# Patient Record
Sex: FEMALE | Race: WHITE | NOT HISPANIC OR LATINO | Employment: FULL TIME | URBAN - METROPOLITAN AREA
[De-identification: names, ages, dates, MRNs, and addresses within clinical notes are randomized per-mention and may not be internally consistent; named-entity substitution may affect disease eponyms.]

---

## 2018-09-10 ENCOUNTER — HOSPITAL ENCOUNTER (EMERGENCY)
Facility: CLINIC | Age: 48
Discharge: HOME | End: 2018-09-10
Attending: FAMILY MEDICINE | Admitting: FAMILY MEDICINE
Payer: COMMERCIAL

## 2018-09-10 VITALS
TEMPERATURE: 97 F | WEIGHT: 276 LBS | SYSTOLIC BLOOD PRESSURE: 151 MMHG | DIASTOLIC BLOOD PRESSURE: 90 MMHG | RESPIRATION RATE: 20 BRPM | BODY MASS INDEX: 48.89 KG/M2 | OXYGEN SATURATION: 100 %

## 2018-09-10 DIAGNOSIS — R00.2 PALPITATIONS: ICD-10-CM

## 2018-09-10 LAB
ALBUMIN SERPL-MCNC: 3.6 G/DL (ref 3.4–5)
ALP SERPL-CCNC: 76 U/L (ref 40–150)
ALT SERPL W P-5'-P-CCNC: 21 U/L (ref 0–50)
ANION GAP SERPL CALCULATED.3IONS-SCNC: 11 MMOL/L (ref 3–14)
AST SERPL W P-5'-P-CCNC: 18 U/L (ref 0–45)
BASOPHILS # BLD AUTO: 0.1 10E9/L (ref 0–0.2)
BASOPHILS NFR BLD AUTO: 0.9 %
BILIRUB SERPL-MCNC: 0.5 MG/DL (ref 0.2–1.3)
BUN SERPL-MCNC: 11 MG/DL (ref 7–30)
CALCIUM SERPL-MCNC: 9.4 MG/DL (ref 8.5–10.1)
CHLORIDE SERPL-SCNC: 110 MMOL/L (ref 94–109)
CO2 SERPL-SCNC: 27 MMOL/L (ref 20–32)
CREAT SERPL-MCNC: 0.82 MG/DL (ref 0.52–1.04)
DIFFERENTIAL METHOD BLD: NORMAL
EOSINOPHIL NFR BLD AUTO: 2.3 %
ERYTHROCYTE [DISTWIDTH] IN BLOOD BY AUTOMATED COUNT: 14.1 % (ref 10–15)
GFR SERPL CREATININE-BSD FRML MDRD: 74 ML/MIN/1.7M2
GLUCOSE SERPL-MCNC: 93 MG/DL (ref 70–99)
HCT VFR BLD AUTO: 41.2 % (ref 35–47)
HGB BLD-MCNC: 13.2 G/DL (ref 11.7–15.7)
IMM GRANULOCYTES # BLD: 0 10E9/L (ref 0–0.4)
IMM GRANULOCYTES NFR BLD: 0.2 %
LYMPHOCYTES # BLD AUTO: 3.2 10E9/L (ref 0.8–5.3)
LYMPHOCYTES NFR BLD AUTO: 57.6 %
MCH RBC QN AUTO: 28 PG (ref 26.5–33)
MCHC RBC AUTO-ENTMCNC: 32 G/DL (ref 31.5–36.5)
MCV RBC AUTO: 88 FL (ref 78–100)
MONOCYTES # BLD AUTO: 0.3 10E9/L (ref 0–1.3)
MONOCYTES NFR BLD AUTO: 5.9 %
NEUTROPHILS # BLD AUTO: 1.8 10E9/L (ref 1.6–8.3)
NEUTROPHILS NFR BLD AUTO: 33.1 %
NRBC # BLD AUTO: 0 10*3/UL
NRBC BLD AUTO-RTO: 0 /100
PLATELET # BLD AUTO: 202 10E9/L (ref 150–450)
POTASSIUM SERPL-SCNC: 3.7 MMOL/L (ref 3.4–5.3)
PROT SERPL-MCNC: 7.1 G/DL (ref 6.8–8.8)
RBC # BLD AUTO: 4.71 10E12/L (ref 3.8–5.2)
SODIUM SERPL-SCNC: 148 MMOL/L (ref 133–144)
TROPONIN I SERPL-MCNC: <0.015 UG/L (ref 0–0.04)
WBC # BLD AUTO: 5.6 10E9/L (ref 4–11)

## 2018-09-10 PROCEDURE — 93010 ELECTROCARDIOGRAM REPORT: CPT | Mod: Z6 | Performed by: FAMILY MEDICINE

## 2018-09-10 PROCEDURE — 84484 ASSAY OF TROPONIN QUANT: CPT | Performed by: FAMILY MEDICINE

## 2018-09-10 PROCEDURE — 99284 EMERGENCY DEPT VISIT MOD MDM: CPT | Performed by: FAMILY MEDICINE

## 2018-09-10 PROCEDURE — 80053 COMPREHEN METABOLIC PANEL: CPT | Performed by: FAMILY MEDICINE

## 2018-09-10 PROCEDURE — 99283 EMERGENCY DEPT VISIT LOW MDM: CPT | Mod: 25 | Performed by: FAMILY MEDICINE

## 2018-09-10 PROCEDURE — 93005 ELECTROCARDIOGRAM TRACING: CPT | Performed by: FAMILY MEDICINE

## 2018-09-10 PROCEDURE — 85025 COMPLETE CBC W/AUTO DIFF WBC: CPT | Performed by: FAMILY MEDICINE

## 2018-09-10 NOTE — ED AVS SNAPSHOT
[facility] Emergency Department    [address] [place] [address]    Phone:  [phone]    Fax:  [phone]                                       Socorro Del Cid   MRN: [id number]    Department:  [facility] Emergency Department   Date of Visit:  9/10/2018           After Visit Summary Signature Page     I have received my discharge instructions, and my questions have been answered. I have discussed any challenges I see with this plan with the nurse or doctor.    ..........................................................................................................................................  Patient/Patient Representative Signature      ..........................................................................................................................................  Patient Representative Print Name and Relationship to Patient    ..................................................               ................................................  Date                                            Time    ..........................................................................................................................................  Reviewed by Signature/Title    ...................................................              ..............................................  Date                                                            Time          22EPIC Rev 08/18

## 2018-09-10 NOTE — ED TRIAGE NOTES
"Pt reports she woke up about 420 this morning with a \"racing heart\" and describe as a \"fluttering\". States she has had this in the past but it was related to increased caffeine use. Pt states her symptoms improved when she arrived in the ED.   "

## 2018-09-10 NOTE — ED PROVIDER NOTES
History     Chief Complaint   Patient presents with     Tachycardia     HPI  Socorro Del Cid is a 48 year old female who presents with feeling like her heart is racing and fluttering.  This woke her up from her sleep and started about 30 minutes ago or so.  This is happened to her before but usually will stop on its own.  Usually if she will cough or bear down it would stop but today it did not.  This got her concerned so she decided to come in for evaluation.  She states that when she got here everything seemed to go back to normal.  She denied having any chest pain or shortness of breath.  She was not feeling lightheaded or dizzy.  She has not had a workup before for palpitations.  Patient denies any nausea or any vomiting.  She states she does not have a lot of caffeine here recently.    Problem List:    Patient Active Problem List    Diagnosis Date Noted     Morbid obesity (H) 09/18/2014     Priority: Medium        Past Medical History:    History reviewed. No pertinent past medical history.    Past Surgical History:    Past Surgical History:   Procedure Laterality Date     ABDOMEN SURGERY  1/01    Gastric bypass     HYSTERECTOMY VAGINAL, BILATERAL SALPINGO-OOPHERECTOMY, COMBINED         Family History:    Family History   Problem Relation Age of Onset     Hypertension Mother      Diabetes Mother      Cancer Mother      uterine ca     Cancer Father      prostate ca     Lipids No family hx of      HEART DISEASE No family hx of        Social History:  Marital Status:   [2]  Social History   Substance Use Topics     Smoking status: Never Smoker     Smokeless tobacco: Never Used     Alcohol use Yes      Comment: rarely        Medications:      No current outpatient prescriptions on file.      Review of Systems   All other systems reviewed and are negative.      Physical Exam   BP: 130/88  Heart Rate: 67  Temp: 97  F (36.1  C)  Resp: 20  Weight: 125.2 kg (276 lb)  SpO2: 100 %      Physical Exam    Constitutional: She is oriented to person, place, and time. She appears well-developed and well-nourished. No distress.   HENT:   Mouth/Throat: Oropharynx is clear and moist.   Eyes: Conjunctivae are normal.   Neck: Normal range of motion. Neck supple.   Cardiovascular: Normal rate, regular rhythm, normal heart sounds and intact distal pulses.  Exam reveals no gallop and no friction rub.    No murmur heard.  Pulmonary/Chest: Effort normal and breath sounds normal. No respiratory distress. She has no wheezes. She has no rales. She exhibits no tenderness.   Abdominal: Soft. Bowel sounds are normal. She exhibits no distension and no mass. There is no tenderness. There is no guarding.   Musculoskeletal: Normal range of motion. She exhibits no edema or tenderness.   Neurological: She is alert and oriented to person, place, and time.   Skin: Skin is warm and dry. No rash noted. She is not diaphoretic.   Psychiatric: She has a normal mood and affect. Judgment normal.   Nursing note and vitals reviewed.      ED Course     ED Course     Procedures                EKG Interpretation:      Interpreted by Lane Oakley  Time reviewed: now   Symptoms at time of EKG: now   Rhythm: normal sinus   Rate: normal  Axis: NORMAL  Ectopy: none  Conduction: normal  ST Segments/ T Waves: No ST-T wave changes  Q Waves: none  Comparison to prior: No old EKG available    Clinical Impression: normal EKG  Results for orders placed or performed during the hospital encounter of 09/10/18   CBC with platelets differential   Result Value Ref Range    WBC 5.6 4.0 - 11.0 10e9/L    RBC Count 4.71 3.8 - 5.2 10e12/L    Hemoglobin 13.2 11.7 - 15.7 g/dL    Hematocrit 41.2 35.0 - 47.0 %    MCV 88 78 - 100 fl    MCH 28.0 26.5 - 33.0 pg    MCHC 32.0 31.5 - 36.5 g/dL    RDW 14.1 10.0 - 15.0 %    Platelet Count 202 150 - 450 10e9/L    Diff Method Automated Method     % Neutrophils 33.1 %    % Lymphocytes 57.6 %    % Monocytes 5.9 %    % Eosinophils 2.3  %    % Basophils 0.9 %    % Immature Granulocytes 0.2 %    Nucleated RBCs 0 0 /100    Absolute Neutrophil 1.8 1.6 - 8.3 10e9/L    Absolute Lymphocytes 3.2 0.8 - 5.3 10e9/L    Absolute Monocytes 0.3 0.0 - 1.3 10e9/L    Absolute Basophils 0.1 0.0 - 0.2 10e9/L    Abs Immature Granulocytes 0.0 0 - 0.4 10e9/L    Absolute Nucleated RBC 0.0    Comprehensive metabolic panel   Result Value Ref Range    Sodium 148 (H) 133 - 144 mmol/L    Potassium 3.7 3.4 - 5.3 mmol/L    Chloride 110 (H) 94 - 109 mmol/L    Carbon Dioxide 27 20 - 32 mmol/L    Anion Gap 11 3 - 14 mmol/L    Glucose 93 70 - 99 mg/dL    Urea Nitrogen 11 7 - 30 mg/dL    Creatinine 0.82 0.52 - 1.04 mg/dL    GFR Estimate 74 >60 mL/min/1.7m2    GFR Estimate If Black 89 >60 mL/min/1.7m2    Calcium 9.4 8.5 - 10.1 mg/dL    Bilirubin Total 0.5 0.2 - 1.3 mg/dL    Albumin 3.6 3.4 - 5.0 g/dL    Protein Total 7.1 6.8 - 8.8 g/dL    Alkaline Phosphatase 76 40 - 150 U/L    ALT 21 0 - 50 U/L    AST 18 0 - 45 U/L   Troponin I   Result Value Ref Range    Troponin I ES <0.015 0.000 - 0.045 ug/L     Medications - No data to display  Patient labs were reviewed and were unremarkable including a negative troponin.  Patient was monitored here in the emergency department for more than an hour and had no arrhythmias or other heart issues noted on the monitor.  At this point I think it is safe to discharge the patient home.  Certainly sounds suspicious that the patient could have had some runs of SVT.  Patient was told to avoid any caffeine or other stimulants until she can follow-up with primary care doctor.  I would recommend discussing getting an outpatient event monitor possibly set up.      Assessments & Plan (with Medical Decision Making)  palpitations     I have reviewed the nursing notes.    I have reviewed the findings, diagnosis, plan and need for follow up with the patient.              9/10/2018   [facility] EMERGENCY DEPARTMENT     Lane Oakley MD  09/10/18  [id number]

## 2018-09-10 NOTE — DISCHARGE INSTRUCTIONS
"  1.  I would recommend talking to your primary care doctor about getting an event monitor set up for further evaluation of your palpitations.  2.  If your symptoms return, try coughing or bearing down or laying down.  If things do not improve, after 30 minutes, please return to the emergency department.    * Heart Palpitations    Palpitations refers to the feeling that your heart is beating hard, fast or irregular. Some people describe it as \"pounding\" or \"skipped beats\". Palpitations may occur in persons with heart disease, but can also occur in healthy persons. Your doctor does not believe that anything dangerous is causing your symptoms at this time.  Heart-Related Causes:    Arrhythmia (a change from the heart's normal rhythm)    Disease of the heart valves  Non-Heart-Related Causes:    Certain medicines (such as asthma inhalers and decongestants)    Some herbal supplements, energy drinks and pills, and weight loss pills    Illegal stimulant drugs (such as cocaine, crank, methamphetamine, PCP)    Caffeine, alcohol and tobacco    Medical conditions such as thyroid disease, anemia, anxiety and panic disorder  Sometimes the cause cannot be found.  Home Care:  1. Avoid excess caffeine, alcohol, tobacco and any stimulant drugs.  2. Tell your doctor about any prescription or over-the-counter or herbal medicines you take.  Follow Up  with your doctor or as advised by our staff.  Get Prompt Medical Attention  if any of the following occur together with palpitations:    Weakness, dizziness, light-headed or fainting    Chest pain or shortness of breath    Rapid heart rate (over 120 beats per minute, at rest)    Palpitations that lasts over 20 minutes    Weakness of an arm or leg or one side of the face    Difficulty with speech or vision    3522-9789 [employer]. [address] [place] [address]. All rights reserved. This information is not intended as a substitute for professional medical care. " Always follow your healthcare professional's instructions.  This information has been modified by your health care provider with permission from the publisher.

## 2018-09-10 NOTE — PROGRESS NOTES
SUBJECTIVE:   Socorro Del Cid is a 48 year old female who presents to clinic today for the following health issues:      HPI  ED/UC Followup:    Facility:  [facility]  Date of visit: 9/10/2018  Reason for visit: Palpitations  Current Status: Patient states she is feeling better. She hasn't had any heart palpitations since Monday. She was seen at the [facility] for heart palpitations on 9/12. She states she has a little shortness of breath, but nothing that would concern her.      Answers for HPI/ROS submitted by the patient on 9/14/2018   PHQ-2 Score: 4  If you checked off any problems, how difficult have these problems made it for you to do your work, take care of things at home, or get along with other people?: Very difficult  PHQ9 TOTAL SCORE: 15      Dizziness, chest tightness, syncope  Normal stress echo on 9/12/18  TSH elevated    Patient is here for follow-up on ED visits this past week for chest tightness, dizziness and near syncope.  She states that she has had episodes like this since she was a child.  Her mom tells her that she used to turn very pale and almost passed out but then would recover as a child.  Usually the episodes resolve quickly but this past week they did not and this prompted her visit to the ER.  She was noted to have low heart rate in the 40s while she was in the emergency department it may discuss possibly doing a tilt test but felt that this test would not necessarily change the course of treatment for her.  She had a stress echocardiogram done in the ER and this was normal. Her blood work was normal other than her TSH being elevated.  She has wondered whether some of her symptoms were related to anxiety as she has had increased stress recently.  Her mom has moved in with her and her  and she was recently diagnosed with breast cancer.  She has been working on losing weight as well through weight watchers.  She has lost 30 pounds in the past 9 months. She has a strong family  history of anxiety and depression in her mom and both of her children.  She was on  Effexor in the past and weaned herself off of this after she started feeling better.  She feels she is at a point where she needs to restart this medication.  She is also wondering about her thyroid.  Her TSH was elevated in the ER.  She has a family history of thyroid disease in her mom and her maternal grandmother.      ED note from 9/10/18  Socorro Del Cid is a 48 year old female who presents with feeling like her heart is racing and fluttering.  This woke her up from her sleep and started about 30 minutes ago or so.  This is happened to her before but usually will stop on its own.  Usually if she will cough or bear down it would stop but today it did not.  This got her concerned so she decided to come in for evaluation.  She states that when she got here everything seemed to go back to normal.  She denied having any chest pain or shortness of breath.  She was not feeling lightheaded or dizzy.  She has not had a workup before for palpitations.  Patient denies any nausea or any vomiting.  She states she does not have a lot of caffeine here recently.    Patient labs were reviewed and were unremarkable including a negative troponin.  Patient was monitored here in the emergency department for more than an hour and had no arrhythmias or other heart issues noted on the monitor.  At this point I think it is safe to discharge the patient home.  Certainly sounds suspicious that the patient could have had some runs of SVT.  Patient was told to avoid any caffeine or other stimulants until she can follow-up with primary care doctor.  I would recommend discussing getting an outpatient event monitor possibly set up.    [facility] hospital note from 9/12/18  Admitted to the ED observation unit at the [facility] for Dizziness and chest tightness. Troponins negative. Stress test was normal. Received IV fluids improvement of her symptoms. Heart rate was noted  "to be in the 40's. EP cardiology specialists who recommended possible tilt testing as an outpatient. Recommend to discussed with your primary care doctor.  Patient has follow up with her primary care doctor on Friday and will have holter monitor placed.     Has gone to [facility] but has not been seen by FP in 4 years    Problem list and histories reviewed & adjusted, as indicated.  Additional history: as documented    BP Readings from Last 3 Encounters:   09/14/18 132/80   09/12/18 130/72   09/10/18 151/90    Wt Readings from Last 3 Encounters:   09/14/18 272 lb 6.4 oz (123.6 kg)   09/12/18 276 lb (125.2 kg)   09/10/18 276 lb (125.2 kg)                  Labs reviewed in EPIC    ROS:  Constitutional, HEENT, cardiovascular, pulmonary, GI, , musculoskeletal, neuro, skin, endocrine and psych systems are negative, except as otherwise noted.    OBJECTIVE:     /80  Pulse 56  Temp 97.9  F (36.6  C) (Oral)  Resp 18  Ht 5' 3.35\" (1.609 m)  Wt 272 lb 6.4 oz (123.6 kg)  BMI 47.73 kg/m2  Body mass index is 47.73 kg/(m^2).  GENERAL: obese, alert and no acute distress  EYES: Eyes grossly normal to inspection, PERRL and conjunctivae and sclerae normal  NECK: no adenopathy, no asymmetry, masses, or scars and thyroid normal to palpation  RESP: lungs clear to auscultation - no rales, rhonchi or wheezes  CV: regular rate and rhythm, normal S1 S2, no S3 or S4, no murmur, click or rub, no peripheral edema   MS: no gross musculoskeletal defects noted, no edema  SKIN: no suspicious lesions or rashes  NEURO: Normal strength and tone, mentation intact and speech normal  PSYCH: mentation appears normal, affect normal/bright, well-groomed, judgment and insight intact  LYMPH: no cervical, supraclavicular adenopathy    Diagnostic Test Results:  none     ASSESSMENT/PLAN:     1. Palpitations  Chronic issue with recent episodes of longer duration.  Cardiac workup thus far has been normal.  Discussed doing tilt table test " but will hold off for now as it will likely not change the course of action.  She will work on hydrating better and will return to clinic if her symptoms worsen.    2. Moderate episode of recurrent major depressive disorder (H)  Restart venlafaxine and titrate up the dose as needed.  Previously she was taking 150 mg daily.  She will follow-up in 6 weeks in clinic for recheck.  - venlafaxine (EFFEXOR-XR) 37.5 MG 24 hr capsule; Take 1 capsule daily for 7-14 days, then take 2 capsules daily.  Dispense: 49 capsule; Refill: 1    3. Morbid obesity (H)  Congratulated on her weight loss thus far.  Recommended continuing weight watchers and increasing activity level/exercise.    4. Subclinical hypothyroidism  Recheck TSH in 6 weeks.  Consider starting a low-dose of levothyroxine if TSH remains elevated.  - TSH with free T4 reflex; Future    5. Encounter for screening mammogram for breast cancer  - *MA Screening Digital Bilateral; Future    ANJALI [facility]

## 2018-09-10 NOTE — ED AVS SNAPSHOT
" [facility] Emergency Department    [address] [place] [address]    Phone:  [phone]    Fax:  [phone]                                       Socorro Del Cid   MRN: [id number]    Department:  [facility] Emergency Department   Date of Visit:  9/10/2018           Patient Information     Date Of Birth          1970        Your diagnoses for this visit were:     Palpitations        You were seen by Lane Oakley MD.      Follow-up Information     Schedule an appointment as soon as possible for a visit with [facility].    Specialties:  Sports Medicine, Family Medicine, Obstetrics & Gynecology    Why:  For follow up on your ED stay    Contact information:    [address] [employer] [facility] [address] [phone]        Discharge Instructions         1.  I would recommend talking to your primary care doctor about getting an event monitor set up for further evaluation of your palpitations.  2.  If your symptoms return, try coughing or bearing down or laying down.  If things do not improve, after 30 minutes, please return to the emergency department.    * Heart Palpitations    Palpitations refers to the feeling that your heart is beating hard, fast or irregular. Some people describe it as \"pounding\" or \"skipped beats\". Palpitations may occur in persons with heart disease, but can also occur in healthy persons. Your doctor does not believe that anything dangerous is causing your symptoms at this time.  Heart-Related Causes:    Arrhythmia (a change from the heart's normal rhythm)    Disease of the heart valves  Non-Heart-Related Causes:    Certain medicines (such as asthma inhalers and decongestants)    Some herbal supplements, energy drinks and pills, and weight loss pills    Illegal stimulant drugs (such as cocaine, crank, methamphetamine, PCP)    Caffeine, alcohol and tobacco    Medical conditions such as thyroid disease, anemia, anxiety and panic " disorder  Sometimes the cause cannot be found.  Home Care:  1. Avoid excess caffeine, alcohol, tobacco and any stimulant drugs.  2. Tell your doctor about any prescription or over-the-counter or herbal medicines you take.  Follow Up  with your doctor or as advised by our staff.  Get Prompt Medical Attention  if any of the following occur together with palpitations:    Weakness, dizziness, light-headed or fainting    Chest pain or shortness of breath    Rapid heart rate (over 120 beats per minute, at rest)    Palpitations that lasts over 20 minutes    Weakness of an arm or leg or one side of the face    Difficulty with speech or vision    7955-2091 [employer]. [address] [place] [address]. All rights reserved. This information is not intended as a substitute for professional medical care. Always follow your healthcare professional's instructions.  This information has been modified by your health care provider with permission from the publisher.          24 Hour Appointment Hotline       To make an appointment at any [facility], call [phone] ([phone]). If you don't have a family doctor or clinic, we will help you find one. [facility] clinics are conveniently located to serve the needs of you and your family.             Review of your medicines      Notice     You have not been prescribed any medications.            Procedures and tests performed during your visit     CBC with platelets differential    Comprehensive metabolic panel    EKG 12 lead    Peripheral IV catheter    Troponin I      Orders Needing Specimen Collection     None      Pending Results     No orders found from 9/8/2018 to 9/11/2018.            Pending Culture Results     No orders found from 9/8/2018 to 9/11/2018.            Pending Results Instructions     If you had any lab results that were not finalized at the time of your Discharge, you can call the ED Lab Result RN at [phone]. You will be  "contacted by this team for any positive Lab results or changes in treatment. The nurses are available 7 days a week from 10A to 6:30P.  You can leave a message 24 hours per day and they will return your call.        Thank you for choosing [facility]       Thank you for choosing [facility] for your care. Our goal is always to provide you with excellent care. Hearing back from our patients is one way we can continue to improve our services. Please take a few minutes to complete the written survey that you may receive in the mail after you visit with us. Thank you!        [employer] In [employer] Information     [employer] lets you send messages to your doctor, view your test results, renew your prescriptions, schedule appointments and more. To sign up, go to [email or web address] . Click on \"Log in\" on the left side of the screen, which will take you to the Welcome page. Then click on \"Sign up Now\" on the right side of the page.     You will be asked to enter the access code listed below, as well as some personal information. Please follow the directions to create your username and password.     Your access code is: [email or web address]  Expires: 2018  6:12 AM     Your access code will  in 90 days. If you need help or a new code, please call your [facility] clinic or [phone].        Care EveryWhere ID     This is your Care EveryWhere ID. This could be used by other organizations to access your [facility] medical records  [id number]        Equal Access to Services     BRITNEY ROMAN : Hadii qian Krishna, waaxda luqadaha, qaybta kaalmada paula, gregory gillespie . [facility] [phone].    ATENCIÓN: Si habla español, tiene a chavis disposición servicios gratuitos de asistencia lingüística. Llame al [phone].    We comply with applicable federal civil rights laws and [place] laws. We do not discriminate on the basis of race, color, national origin, age, disability, sex, sexual orientation, or gender " identity.            After Visit Summary       This is your record. Keep this with you and show to your community pharmacist(s) and doctor(s) at your next visit.

## 2018-09-12 ENCOUNTER — APPOINTMENT (OUTPATIENT)
Dept: CARDIOLOGY | Facility: CLINIC | Age: 48
End: 2018-09-12
Attending: EMERGENCY MEDICINE
Payer: COMMERCIAL

## 2018-09-12 ENCOUNTER — HOSPITAL ENCOUNTER (OUTPATIENT)
Facility: CLINIC | Age: 48
Setting detail: OBSERVATION
Discharge: HOME | End: 2018-09-12
Attending: EMERGENCY MEDICINE | Admitting: EMERGENCY MEDICINE
Payer: COMMERCIAL

## 2018-09-12 ENCOUNTER — APPOINTMENT (OUTPATIENT)
Dept: GENERAL RADIOLOGY | Facility: CLINIC | Age: 48
End: 2018-09-12
Attending: EMERGENCY MEDICINE
Payer: COMMERCIAL

## 2018-09-12 VITALS
RESPIRATION RATE: 18 BRPM | DIASTOLIC BLOOD PRESSURE: 72 MMHG | WEIGHT: 276 LBS | BODY MASS INDEX: 48.89 KG/M2 | TEMPERATURE: 98.8 F | SYSTOLIC BLOOD PRESSURE: 130 MMHG | OXYGEN SATURATION: 97 % | HEART RATE: 59 BPM

## 2018-09-12 DIAGNOSIS — R07.9 ACUTE CHEST PAIN: ICD-10-CM

## 2018-09-12 DIAGNOSIS — R00.1 SEVERE SINUS BRADYCARDIA: ICD-10-CM

## 2018-09-12 DIAGNOSIS — R07.89 OTHER CHEST PAIN: ICD-10-CM

## 2018-09-12 DIAGNOSIS — R55 NEAR SYNCOPE: ICD-10-CM

## 2018-09-12 LAB
ALBUMIN UR-MCNC: NEGATIVE MG/DL
ANION GAP SERPL CALCULATED.3IONS-SCNC: 7 MMOL/L (ref 3–14)
APPEARANCE UR: CLEAR
APTT PPP: 33 SEC (ref 22–37)
BASOPHILS # BLD AUTO: 0 10E9/L (ref 0–0.2)
BASOPHILS NFR BLD AUTO: 0.4 %
BILIRUB UR QL STRIP: NEGATIVE
BUN SERPL-MCNC: 8 MG/DL (ref 7–30)
CALCIUM SERPL-MCNC: 9.3 MG/DL (ref 8.5–10.1)
CHLORIDE SERPL-SCNC: 108 MMOL/L (ref 94–109)
CO2 SERPL-SCNC: 28 MMOL/L (ref 20–32)
COLOR UR AUTO: NORMAL
CREAT SERPL-MCNC: 0.82 MG/DL (ref 0.52–1.04)
DIFFERENTIAL METHOD BLD: NORMAL
EOSINOPHIL # BLD AUTO: 0.1 10E9/L (ref 0–0.7)
EOSINOPHIL NFR BLD AUTO: 0.9 %
ERYTHROCYTE [DISTWIDTH] IN BLOOD BY AUTOMATED COUNT: 14.4 % (ref 10–15)
GFR SERPL CREATININE-BSD FRML MDRD: 74 ML/MIN/1.7M2
GLUCOSE SERPL-MCNC: 86 MG/DL (ref 70–99)
GLUCOSE UR STRIP-MCNC: NEGATIVE MG/DL
HCT VFR BLD AUTO: 43 % (ref 35–47)
HGB BLD-MCNC: 14.1 G/DL (ref 11.7–15.7)
HGB UR QL STRIP: NEGATIVE
IMM GRANULOCYTES # BLD: 0 10E9/L (ref 0–0.4)
IMM GRANULOCYTES NFR BLD: 0 %
INR PPP: 1.02 (ref 0.86–1.14)
INTERPRETATION ECG - MUSE: NORMAL
KETONES UR STRIP-MCNC: NEGATIVE MG/DL
LEUKOCYTE ESTERASE UR QL STRIP: NEGATIVE
LYMPHOCYTES # BLD AUTO: 2.2 10E9/L (ref 0.8–5.3)
LYMPHOCYTES NFR BLD AUTO: 41.2 %
MAGNESIUM SERPL-MCNC: 1.9 MG/DL (ref 1.6–2.3)
MCH RBC QN AUTO: 28.9 PG (ref 26.5–33)
MCHC RBC AUTO-ENTMCNC: 32.8 G/DL (ref 31.5–36.5)
MCV RBC AUTO: 88 FL (ref 78–100)
MONOCYTES # BLD AUTO: 0.4 10E9/L (ref 0–1.3)
MONOCYTES NFR BLD AUTO: 7.9 %
NEUTROPHILS # BLD AUTO: 2.6 10E9/L (ref 1.6–8.3)
NEUTROPHILS NFR BLD AUTO: 49.6 %
NITRATE UR QL: NEGATIVE
NRBC # BLD AUTO: 0 10*3/UL
NRBC BLD AUTO-RTO: 0 /100
PH UR STRIP: 7 PH (ref 5–7)
PLATELET # BLD AUTO: 208 10E9/L (ref 150–450)
POTASSIUM SERPL-SCNC: 3.4 MMOL/L (ref 3.4–5.3)
RBC # BLD AUTO: 4.88 10E12/L (ref 3.8–5.2)
SODIUM SERPL-SCNC: 144 MMOL/L (ref 133–144)
SOURCE: NORMAL
SP GR UR STRIP: 1 (ref 1–1.03)
T4 FREE SERPL-MCNC: 0.87 NG/DL (ref 0.76–1.46)
TROPONIN I BLD-MCNC: 0 UG/L (ref 0–0.1)
TROPONIN I SERPL-MCNC: <0.015 UG/L (ref 0–0.04)
TROPONIN I SERPL-MCNC: <0.015 UG/L (ref 0–0.04)
TSH SERPL DL<=0.005 MIU/L-ACNC: 6.66 MU/L (ref 0.4–4)
UROBILINOGEN UR STRIP-MCNC: NORMAL MG/DL (ref 0–2)
WBC # BLD AUTO: 5.3 10E9/L (ref 4–11)

## 2018-09-12 PROCEDURE — 84484 ASSAY OF TROPONIN QUANT: CPT

## 2018-09-12 PROCEDURE — 99285 EMERGENCY DEPT VISIT HI MDM: CPT | Mod: 25

## 2018-09-12 PROCEDURE — 93350 STRESS TTE ONLY: CPT | Mod: 26 | Performed by: INTERNAL MEDICINE

## 2018-09-12 PROCEDURE — 93017 CV STRESS TEST TRACING ONLY: CPT

## 2018-09-12 PROCEDURE — 93321 DOPPLER ECHO F-UP/LMTD STD: CPT | Mod: 26 | Performed by: INTERNAL MEDICINE

## 2018-09-12 PROCEDURE — C9113 INJ PANTOPRAZOLE SODIUM, VIA: HCPCS | Performed by: NURSE PRACTITIONER

## 2018-09-12 PROCEDURE — 81003 URINALYSIS AUTO W/O SCOPE: CPT | Performed by: EMERGENCY MEDICINE

## 2018-09-12 PROCEDURE — 71046 X-RAY EXAM CHEST 2 VIEWS: CPT

## 2018-09-12 PROCEDURE — 93325 DOPPLER ECHO COLOR FLOW MAPG: CPT | Mod: 26 | Performed by: INTERNAL MEDICINE

## 2018-09-12 PROCEDURE — 85730 THROMBOPLASTIN TIME PARTIAL: CPT | Performed by: EMERGENCY MEDICINE

## 2018-09-12 PROCEDURE — 84484 ASSAY OF TROPONIN QUANT: CPT | Performed by: EMERGENCY MEDICINE

## 2018-09-12 PROCEDURE — 83735 ASSAY OF MAGNESIUM: CPT | Performed by: EMERGENCY MEDICINE

## 2018-09-12 PROCEDURE — 84443 ASSAY THYROID STIM HORMONE: CPT | Performed by: EMERGENCY MEDICINE

## 2018-09-12 PROCEDURE — 84439 ASSAY OF FREE THYROXINE: CPT | Performed by: EMERGENCY MEDICINE

## 2018-09-12 PROCEDURE — 25000128 H RX IP 250 OP 636: Performed by: NURSE PRACTITIONER

## 2018-09-12 PROCEDURE — G0378 HOSPITAL OBSERVATION PER HR: HCPCS

## 2018-09-12 PROCEDURE — 85025 COMPLETE CBC W/AUTO DIFF WBC: CPT | Performed by: EMERGENCY MEDICINE

## 2018-09-12 PROCEDURE — 93005 ELECTROCARDIOGRAM TRACING: CPT

## 2018-09-12 PROCEDURE — 99236 HOSP IP/OBS SAME DATE HI 85: CPT | Mod: 25 | Performed by: NURSE PRACTITIONER

## 2018-09-12 PROCEDURE — 96360 HYDRATION IV INFUSION INIT: CPT | Mod: 59

## 2018-09-12 PROCEDURE — 80048 BASIC METABOLIC PNL TOTAL CA: CPT | Performed by: EMERGENCY MEDICINE

## 2018-09-12 PROCEDURE — 25000128 H RX IP 250 OP 636: Performed by: EMERGENCY MEDICINE

## 2018-09-12 PROCEDURE — 25500064 ZZH RX 255 OP 636: Performed by: INTERNAL MEDICINE

## 2018-09-12 PROCEDURE — 93018 CV STRESS TEST I&R ONLY: CPT | Performed by: INTERNAL MEDICINE

## 2018-09-12 PROCEDURE — 93016 CV STRESS TEST SUPVJ ONLY: CPT | Performed by: INTERNAL MEDICINE

## 2018-09-12 PROCEDURE — 93010 ELECTROCARDIOGRAM REPORT: CPT | Mod: Z6 | Performed by: EMERGENCY MEDICINE

## 2018-09-12 PROCEDURE — 83735 ASSAY OF MAGNESIUM: CPT | Performed by: NURSE PRACTITIONER

## 2018-09-12 PROCEDURE — 85610 PROTHROMBIN TIME: CPT | Performed by: EMERGENCY MEDICINE

## 2018-09-12 PROCEDURE — 99204 OFFICE O/P NEW MOD 45 MIN: CPT | Mod: 25 | Performed by: INTERNAL MEDICINE

## 2018-09-12 RX ORDER — POTASSIUM CHLORIDE 1.5 G/1.58G
20-40 POWDER, FOR SOLUTION ORAL
Status: DISCONTINUED | OUTPATIENT
Start: 2018-09-12 | End: 2018-09-12 | Stop reason: HOSPADM

## 2018-09-12 RX ORDER — ALUMINA, MAGNESIA, AND SIMETHICONE 2400; 2400; 240 MG/30ML; MG/30ML; MG/30ML
30 SUSPENSION ORAL EVERY 4 HOURS PRN
Status: DISCONTINUED | OUTPATIENT
Start: 2018-09-12 | End: 2018-09-12 | Stop reason: HOSPADM

## 2018-09-12 RX ORDER — POTASSIUM CL/LIDO/0.9 % NACL 10MEQ/0.1L
10 INTRAVENOUS SOLUTION, PIGGYBACK (ML) INTRAVENOUS
Status: DISCONTINUED | OUTPATIENT
Start: 2018-09-12 | End: 2018-09-12 | Stop reason: HOSPADM

## 2018-09-12 RX ORDER — ACETAMINOPHEN 650 MG/1
650 SUPPOSITORY RECTAL EVERY 4 HOURS PRN
Status: DISCONTINUED | OUTPATIENT
Start: 2018-09-12 | End: 2018-09-12 | Stop reason: HOSPADM

## 2018-09-12 RX ORDER — NITROGLYCERIN 0.4 MG/1
0.4 TABLET SUBLINGUAL EVERY 5 MIN PRN
Status: DISCONTINUED | OUTPATIENT
Start: 2018-09-12 | End: 2018-09-12 | Stop reason: HOSPADM

## 2018-09-12 RX ORDER — POTASSIUM CHLORIDE 29.8 MG/ML
20 INJECTION INTRAVENOUS
Status: DISCONTINUED | OUTPATIENT
Start: 2018-09-12 | End: 2018-09-12 | Stop reason: HOSPADM

## 2018-09-12 RX ORDER — POTASSIUM CHLORIDE 750 MG/1
20-40 TABLET, EXTENDED RELEASE ORAL
Status: DISCONTINUED | OUTPATIENT
Start: 2018-09-12 | End: 2018-09-12 | Stop reason: HOSPADM

## 2018-09-12 RX ORDER — POTASSIUM CHLORIDE 7.45 MG/ML
10 INJECTION INTRAVENOUS
Status: DISCONTINUED | OUTPATIENT
Start: 2018-09-12 | End: 2018-09-12 | Stop reason: HOSPADM

## 2018-09-12 RX ORDER — ACETAMINOPHEN 325 MG/1
650 TABLET ORAL EVERY 4 HOURS PRN
Status: DISCONTINUED | OUTPATIENT
Start: 2018-09-12 | End: 2018-09-12 | Stop reason: HOSPADM

## 2018-09-12 RX ORDER — MAGNESIUM SULFATE HEPTAHYDRATE 40 MG/ML
4 INJECTION, SOLUTION INTRAVENOUS EVERY 4 HOURS PRN
Status: DISCONTINUED | OUTPATIENT
Start: 2018-09-12 | End: 2018-09-12 | Stop reason: HOSPADM

## 2018-09-12 RX ORDER — ASPIRIN 81 MG/1
81 TABLET ORAL DAILY
Status: DISCONTINUED | OUTPATIENT
Start: 2018-09-13 | End: 2018-09-12 | Stop reason: HOSPADM

## 2018-09-12 RX ORDER — NALOXONE HYDROCHLORIDE 0.4 MG/ML
.1-.4 INJECTION, SOLUTION INTRAMUSCULAR; INTRAVENOUS; SUBCUTANEOUS
Status: DISCONTINUED | OUTPATIENT
Start: 2018-09-12 | End: 2018-09-12 | Stop reason: HOSPADM

## 2018-09-12 RX ORDER — LIDOCAINE 40 MG/G
CREAM TOPICAL
Status: DISCONTINUED | OUTPATIENT
Start: 2018-09-12 | End: 2018-09-12 | Stop reason: HOSPADM

## 2018-09-12 RX ADMIN — PANTOPRAZOLE SODIUM 40 MG: 40 INJECTION, POWDER, FOR SOLUTION INTRAVENOUS at 14:15

## 2018-09-12 RX ADMIN — SODIUM CHLORIDE 1000 ML: 9 INJECTION, SOLUTION INTRAVENOUS at 10:41

## 2018-09-12 RX ADMIN — PERFLUTREN 5 ML: 6.52 INJECTION, SUSPENSION INTRAVENOUS at 14:31

## 2018-09-12 ASSESSMENT — ENCOUNTER SYMPTOMS
SHORTNESS OF BREATH: 0
WEAKNESS: 1
LIGHT-HEADEDNESS: 1
CHEST TIGHTNESS: 1
PALPITATIONS: 0

## 2018-09-12 ASSESSMENT — ACTIVITIES OF DAILY LIVING (ADL)
RETIRED_EATING: 0-->INDEPENDENT
DRESS: 0-->INDEPENDENT
RETIRED_COMMUNICATION: 0-->UNDERSTANDS/COMMUNICATES WITHOUT DIFFICULTY
BATHING: 0-->INDEPENDENT
TOILETING: 0-->INDEPENDENT
SWALLOWING: 0-->SWALLOWS FOODS/LIQUIDS WITHOUT DIFFICULTY

## 2018-09-12 NOTE — DISCHARGE SUMMARY
Discharge Summary    Socorro Del Cid MRN# [id number]   YOB: 1970 Age: 48 year old     Date of Admission:  9/12/2018  Date of Discharge:  9/12/2018  Admitting Physician:  Niels Jennings MD  Discharge Physician:  Anna Moses MD  Discharging Service:  Emergency Department Observation Unit     Primary Provider: No Ref-Primary, Physician          Discharge Diagnosis:     * No active hospital problems. *    * No resolved hospital problems. *               Discharge Disposition:   Discharged to home           Condition on Discharge:   Discharge condition: Stable               Procedures:   Chest xray, stress test          Discharge Medications:   There are no discharge medications for this patient.            Consultations:   EP             Brief History of Illness:   Socorro Del Cid is a 48 year old female with a history of Hypothyroidism, palpitations, Dysthymic disorder, obesity, gastric bypass, cholecystectomy,   who presented to the ED with chest tightness and dizziness.           Hospital Course:   Admitted to the ED observation unit at the [facility] for Dizziness and chest tightness. Troponins negative. Stress test was normal. Received IV fluids improvement of her symptoms. Heart rate was noted to be in the 40's. EP cardiology specialists who recommended possible tilt testing as an outpatient. Recommend to discussed with your primary care doctor.  Patient has follow up with her primary care doctor on Friday and will have holter monitor placed.             Final Day of Progress before Discharge:       Physical Exam:  Blood pressure 130/72, pulse 59, temperature 98.8  F (37.1  C), temperature source Oral, resp. rate 18, weight 125.2 kg (276 lb), SpO2 97 %.    EXAM:  Constitutional: Obese, alert and no distress   Head: Normocephalic. No masses, lesions, tenderness or abnormalities   Neck: Neck supple. No adenopathy. Thyroid symmetric, normal size,, Carotids without bruits.   ENT: ENT exam  normal, no neck nodes or sinus tenderness   Cardiovascular: RRR. No murmurs, clicks gallops or rub   Respiratory: . Good diaphragmatic excursion. Lungs clear   Gastrointestinal: Abdomen soft,. BS normal. No masses, organomegaly.   : Deferred   Musculoskeletal: extremities normal- no gross deformities noted, gait normal and normal muscle tone   Skin: no suspicious lesions or rashes   Neurologic: Gait normal. Reflexes normal and symmetric. Sensation grossly WNL.   Psychiatric: mentation appears normal and affect normal/bright          Data:  All laboratory data reviewed             Significant Results:   None  Results for orders placed or performed during the hospital encounter of 09/12/18   XR Chest 2 Views    Narrative    Exam: XR CHEST 2 VW, 9/12/2018 10:05 AM    Indication: Chest pain;     Comparison: 8/16/2014    Findings:   PA and lateral views of the chest. The trachea is midline. The  cardiomediastinal silhouette and pulmonary vasculature are within  normal limits. There are no focal airspace opacities. No pleural  effusions or pneumothorax. The upper abdomen is unremarkable. No acute  osseous lesions.      Impression    Impression: No acute cardiopulmonary disease.     I have personally reviewed the examination and initial interpretation  and I agree with the findings.    NEVAEH GARRETT MD   CBC with platelets differential   Result Value Ref Range    WBC 5.3 4.0 - 11.0 10e9/L    RBC Count 4.88 3.8 - 5.2 10e12/L    Hemoglobin 14.1 11.7 - 15.7 g/dL    Hematocrit 43.0 35.0 - 47.0 %    MCV 88 78 - 100 fl    MCH 28.9 26.5 - 33.0 pg    MCHC 32.8 31.5 - 36.5 g/dL    RDW 14.4 10.0 - 15.0 %    Platelet Count 208 150 - 450 10e9/L    Diff Method Automated Method     % Neutrophils 49.6 %    % Lymphocytes 41.2 %    % Monocytes 7.9 %    % Eosinophils 0.9 %    % Basophils 0.4 %    % Immature Granulocytes 0.0 %    Nucleated RBCs 0 0 /100    Absolute Neutrophil 2.6 1.6 - 8.3 10e9/L    Absolute Lymphocytes 2.2 0.8 - 5.3  10e9/L    Absolute Monocytes 0.4 0.0 - 1.3 10e9/L    Absolute Eosinophils 0.1 0.0 - 0.7 10e9/L    Absolute Basophils 0.0 0.0 - 0.2 10e9/L    Abs Immature Granulocytes 0.0 0 - 0.4 10e9/L    Absolute Nucleated RBC 0.0    Basic metabolic panel   Result Value Ref Range    Sodium 144 133 - 144 mmol/L    Potassium 3.4 3.4 - 5.3 mmol/L    Chloride 108 94 - 109 mmol/L    Carbon Dioxide 28 20 - 32 mmol/L    Anion Gap 7 3 - 14 mmol/L    Glucose 86 70 - 99 mg/dL    Urea Nitrogen 8 7 - 30 mg/dL    Creatinine 0.82 0.52 - 1.04 mg/dL    GFR Estimate 74 >60 mL/min/1.7m2    GFR Estimate If Black 90 >60 mL/min/1.7m2    Calcium 9.3 8.5 - 10.1 mg/dL   Troponin I   Result Value Ref Range    Troponin I ES <0.015 0.000 - 0.045 ug/L   INR   Result Value Ref Range    INR 1.02 0.86 - 1.14   Partial thromboplastin time   Result Value Ref Range    PTT 33 22 - 37 sec   UA reflex to Microscopic and Culture   Result Value Ref Range    Color Urine Light Yellow     Appearance Urine Clear     Glucose Urine Negative NEG^Negative mg/dL    Bilirubin Urine Negative NEG^Negative    Ketones Urine Negative NEG^Negative mg/dL    Specific Gravity Urine 1.003 1.003 - 1.035    Blood Urine Negative NEG^Negative    pH Urine 7.0 5.0 - 7.0 pH    Protein Albumin Urine Negative NEG^Negative mg/dL    Urobilinogen mg/dL Normal 0.0 - 2.0 mg/dL    Nitrite Urine Negative NEG^Negative    Leukocyte Esterase Urine Negative NEG^Negative    Source Midstream Urine    TSH with free T4 reflex   Result Value Ref Range    TSH 6.66 (H) 0.40 - 4.00 mU/L   T4 free   Result Value Ref Range    T4 Free 0.87 0.76 - 1.46 ng/dL   Troponin I (second draw)   Result Value Ref Range    Troponin I ES <0.015 0.000 - 0.045 ug/L   Magnesium   Result Value Ref Range    Magnesium 1.9 1.6 - 2.3 mg/dL   EKG 12 lead   Result Value Ref Range    Interpretation ECG Click View Image link to view waveform and result    Troponin POCT   Result Value Ref Range    Troponin I 0.00 0.00 - 0.10 ug/L   Echo stress  test with definity    Narrative    [id number]  [id number]  [id number]  [id number]^BERYL^FLAQUITA^[facility],[place]  Echocardiography Laboratory  [address] [place] [id number]  Name: FAZAL GAO  MRN: [id number]  : 1970  Study Date: 2018 02:12 PM  Age: 48 yrs  Gender: Female  Patient Location: [facility]  Reason For Study: Chest Pain  History: Chest Pain  Ordering Physician: FLAQUITA CORDOBA  Performed By: Elsy Pollock RDCS     BSA: 2.3 m2  Height: 63 in  Weight: 304 lb  HR: 57  BP: 124/44 mmHg  _____________________________________________________________________________  __        Procedure  Contrast Definity. Stress Echo Bike with two dimensional, color and spectral  Doppler performed.  _____________________________________________________________________________  __        Interpretation Summary  This was a normal stress echocardiogram with no evidence of stress-induced  ischemia. Normal resting LV function with EF of approximately 60-65%; normal  response to exercise with increase to approximately 70-75%. No stress induced  regional wall motion abnormalities. No ECG evidence of ischemia. No  significant valvular disease noted on routine screening color flow Doppler and  pulsed Doppler examination. Poor functional capacity.  No resting wallmotion abnormality.  The patient did not exhibit any symptoms during exercise.  Normal blood pressure response with stress.  _____________________________________________________________________________  __     Stress  Definity (NDC #89865-384-24) given intravenously.  There was a normal BP response to exercise.  Patient was given 5ml mixture of 1.5ml Definity and 8.5ml saline.  5 ml wasted.  Definity Expiration 19 .  Definity Lot # 6216 .  This was a normal stress EKG with no evidence of stress-induced ischemia.  Peak MVO2 10.9 ml/kg/min .  Percent predicted MVO2 37.5 %.  RPP 30529.  Maximum workload 100  barnett.  Target Heart Rate was achieved.  Exercise was stopped due to fatigue.  This study was stopped as the patient achieved an adequate exercise effort for  a diagnostic study.  The patient did not exhibit any symptoms during exercise.  Normal blood pressure response with stress.     Rest  Normal baseline electrocardiogram.  The patient did not exhibit any symptoms during exercise.  Limiting Symptom (s) : fatigue.  Angina present? No.     Stress Results        Protocol:  Bike Stress Echo        Maximum Predicted HR:   172 bpm        Target HR: 146 bpm                 % Maximum Predicted HR: 97 %                             Stage  DurationHeart Rate  BP                                 (mm:ss)   (bpm)                         Baseline  0:00      57    124/44                           Peak    4:00     166    175/78                            Stress Duration:   4:00 mm:ss *                      Maximum Stress HR: 166 bpm *     Vessels  Aortic root normal.     _____________________________________________________________________________  __                             Report approved by: Trey Chanel 09/12/2018 03:22 PM                    _____________________________________________________________________________  __         Recent Results (from the past 48 hour(s))   XR Chest 2 Views    Narrative    Exam: XR CHEST 2 VW, 9/12/2018 10:05 AM    Indication: Chest pain;     Comparison: 8/16/2014    Findings:   PA and lateral views of the chest. The trachea is midline. The  cardiomediastinal silhouette and pulmonary vasculature are within  normal limits. There are no focal airspace opacities. No pleural  effusions or pneumothorax. The upper abdomen is unremarkable. No acute  osseous lesions.      Impression    Impression: No acute cardiopulmonary disease.     I have personally reviewed the examination and initial interpretation  and I agree with the findings.    NEVAEH GARRETT MD                Pending Results:    Unresulted Labs Ordered in the Past 30 Days of this Admission     Date and Time Order Name Status Description    9/12/2018 0857 T4 free In process                   Discharge Instructions and Follow-Up:     Discharge Procedure Orders  Reason for your hospital stay   Order Comments: Chest pressure and dizziness.     Adult [facility] Follow-up and recommended labs and tests   Order Comments: Follow up with PCP in one week, return to the ER if having chest pain, syncope, shortness of breath or fever greater than 101F.   Appointments on [place] and/or [facility] (with [facility] or [facility] provider or service). Call [phone] if you haven't heard regarding these appointments within 7 days of discharge.     Activity   Order Comments: Your activity upon discharge: As tolerated   Order Specific Question Answer Comments   Is discharge order? Yes      When to contact your care team   Order Comments: . Please go to your nearest emergency room If you were to have a change in the type of chest pain i.e more severe, lasting longer or radiating to your shoulder, arm, neck, jaw or back, shortness of breath or increased pain with breathing, coughing up blood, feel dizzy or lightheaded, or notice swelling in one leg.     Discharge Instructions   Order Comments: You were admitted to the ED observation unit at the [facility] for Dizziness and chest tightness. Troponins (a lab test to check if there were damage to the heart tissue) were checked and these were negative. Your stress test was normal. You received IV fluids with improvement of your symptoms. Your heart rate was noted to be in the 40's while you were here (normal heart rate 60-90) You were seen by the  cardiology specialists who recommended** Please follow up with your primary care doctor on Friday and have holter monitor placed as previously scheduled.     Diet   Order Comments: Follow this diet upon discharge: Regular diet   Order Specific Question Answer Comments   Is  discharge order? Yes             Attestation:  Marian Calloway.

## 2018-09-12 NOTE — ED TRIAGE NOTES
"Pt  Refer from clinic with 3 days of dizziness and intermittent pain over heart that she describes not as pain but as \"burning\".  GCS 15   "

## 2018-09-12 NOTE — IP AVS SNAPSHOT
Unit 6D Observation [address] [address]    Phone:  [phone]    Fax:  [phone]                                       After Visit Summary   9/12/2018    Socorro Del Cid    MRN: [id number]           After Visit Summary Signature Page     I have received my discharge instructions, and my questions have been answered. I have discussed any challenges I see with this plan with the nurse or doctor.    ..........................................................................................................................................  Patient/Patient Representative Signature      ..........................................................................................................................................  Patient Representative Print Name and Relationship to Patient    ..................................................               ................................................  Date                                   Time    ..........................................................................................................................................  Reviewed by Signature/Title    ...................................................              ..............................................  Date                                               Time          22EPIC Rev 08/18

## 2018-09-12 NOTE — PROGRESS NOTES
/72 (BP Location: Left arm)  Pulse 59  Temp 98.8  F (37.1  C) (Oral)  Resp 18  Wt 125.2 kg (276 lb)  SpO2 97%  BMI 48.89 kg/m2  Patient's condition and vital Signs are stable/WNL.  Discharge instructions reviewed with patient and questions answered. Patient verbalizes understanding. IV removed. Pain under control.  Patient is tolerating normal diet and denies any N/V. Patient to be discharged to home via self. Patient has all belongings.

## 2018-09-12 NOTE — PLAN OF CARE
Problem: Patient Care Overview  Goal: Plan of Care/Patient Progress Review  Outcome: Improving  Outpatient/Observation goals to be met before discharge home:    - Serial troponins and stress test complete. YES  - Seen and cleared by consultant if applicable YES  - Adequate pain control on oral analgesia YES  - Vital signs normal or at patient baseline YES  - Safe disposition plan has been identified YES

## 2018-09-12 NOTE — H&P
[facility] ED Observation Admission Note    Chief Complaint   Patient presents with     Dizziness       Assessment/Plan:  Socorro Del Cid is a 48 year old female with a history of Hypothyroidism, palpitations, Dysthymic disorder, obesity, gastric bypass, cholecystectomy,   who presented to the ED with chest tightness and dizziness.     1. Chest pain:   2. Near syncope:   3. Bradycardia: Sitting at desk with dizziness this am with chest tightness and burning. EKG in the ED with sinus bradycardia no acute ST or T wave changes significant for ischemia but did have a early left bundle branch block pattern.  This was consistent with previous EKGs although previous EKGs was more consistent with a right bundle branch block pattern.. Chest xray negative. Troponins negative x2. Patient reports symptoms had resolved. In ED patient became bradycardic to the low 40's and was symptomatic. EP consulted.  TSH 6.66. T4 Free 0.87  - Cardiac monitoring  - EKG prn  - Stress test at 3 pm  - EP consult  - Keep potassium <4.0 and Magnesium <2.0  - Protonix IV BID    HPI:    Socorro Del Cid is a 48 year old female with a history of palpitations, Dysthymic disorder, obesity, gastric bypass, cholecystectomy, who presented to the ED with chest tightness and dizziness.  Patient was seen in the ED 2 days ago for palpitations. Reports previous episodes of palpitations resolved when she bears down or coughs. She has not had any palpitations for 2 years.  EKG normal. No arrthymias were seen on cardiac monitor and Troponin negative. Recommended to follow up with PCP. Recommended to have outpatient holter monitor. This morning, the patient was sitting at her desk when she felt dizzy, chest tightness and chest burning. She has experienced GERD in the past and has taken antacids in the past. She denies any associated back pain, jaw pain, arm pain, nausea, vomiting, fevers, chills,  Negative cardiac family history. Reports she has an appointment set up with her  PCP on Friday for holter monitor set up and follow up from ED visit on Monday.     In the ED   Vitals:BP:138/68 Pulse: 56 Temp: 98.1 Resp: 18 SP02:99 % Labs: Na 144, K 3.4, Cr 0.82, TSH 6.66, T4 free 0.87, Troponins negative x 2, BG 86, WBC 5.3, Hgb 14.1, Plt 208,  UA negative. Medications: NS Bolus 1 L x1, Imaging: Chest xray negative, EKG sinus bradycardia no acute ST or T wave changes significant for ischemia but did have a early left bundle branch block pattern.  This was consistent with previous EKGs although previous EKGs was more consistent with a right bundle branch block pattern. Consults: EP Plan: Admit to ED observation for ACS r/o and EP consult.     On admission to the observation unit the patient was stable denied any chest pressure, palpitations. Reported improvement of her dizziness with IV fluids.     History:    History reviewed. No pertinent past medical history.    Past Surgical History:   Procedure Laterality Date     ABDOMEN SURGERY  1/01    Gastric bypass     HYSTERECTOMY VAGINAL, BILATERAL SALPINGO-OOPHERECTOMY, COMBINED         Family History   Problem Relation Age of Onset     Hypertension Mother      Diabetes Mother      Cancer Mother      uterine ca     Cancer Father      prostate ca     Lipids No family hx of      HEART DISEASE No family hx of        Social History     Social History     Marital status:      Spouse name: N/A     Number of children: N/A     Years of education: N/A     Occupational History     Not on file.     Social History Main Topics     Smoking status: Never Smoker     Smokeless tobacco: Never Used     Alcohol use Yes      Comment: rarely     Drug use: No     Sexual activity: Yes     Partners: Male     Birth control/ protection: Surgical      Comment: hysterectomy     Other Topics Concern     Parent/Sibling W/ Cabg, Mi Or Angioplasty Before 65f 55m? No     Social History Narrative         No current facility-administered medications on file prior to encounter.    No current outpatient prescriptions on file prior to encounter.    Data:    Results for orders placed or performed during the hospital encounter of 09/12/18   XR Chest 2 Views    Narrative    Exam: XR CHEST 2 VW, 9/12/2018 10:05 AM    Indication: Chest pain;     Comparison: 8/16/2014    Findings:   PA and lateral views of the chest. The trachea is midline. The  cardiomediastinal silhouette and pulmonary vasculature are within  normal limits. There are no focal airspace opacities. No pleural  effusions or pneumothorax. The upper abdomen is unremarkable. No acute  osseous lesions.      Impression    Impression: No acute cardiopulmonary disease.    CBC with platelets differential   Result Value Ref Range    WBC 5.3 4.0 - 11.0 10e9/L    RBC Count 4.88 3.8 - 5.2 10e12/L    Hemoglobin 14.1 11.7 - 15.7 g/dL    Hematocrit 43.0 35.0 - 47.0 %    MCV 88 78 - 100 fl    MCH 28.9 26.5 - 33.0 pg    MCHC 32.8 31.5 - 36.5 g/dL    RDW 14.4 10.0 - 15.0 %    Platelet Count 208 150 - 450 10e9/L    Diff Method Automated Method     % Neutrophils 49.6 %    % Lymphocytes 41.2 %    % Monocytes 7.9 %    % Eosinophils 0.9 %    % Basophils 0.4 %    % Immature Granulocytes 0.0 %    Nucleated RBCs 0 0 /100    Absolute Neutrophil 2.6 1.6 - 8.3 10e9/L    Absolute Lymphocytes 2.2 0.8 - 5.3 10e9/L    Absolute Monocytes 0.4 0.0 - 1.3 10e9/L    Absolute Eosinophils 0.1 0.0 - 0.7 10e9/L    Absolute Basophils 0.0 0.0 - 0.2 10e9/L    Abs Immature Granulocytes 0.0 0 - 0.4 10e9/L    Absolute Nucleated RBC 0.0    Basic metabolic panel   Result Value Ref Range    Sodium 144 133 - 144 mmol/L    Potassium 3.4 3.4 - 5.3 mmol/L    Chloride 108 94 - 109 mmol/L    Carbon Dioxide 28 20 - 32 mmol/L    Anion Gap 7 3 - 14 mmol/L    Glucose 86 70 - 99 mg/dL    Urea Nitrogen 8 7 - 30 mg/dL    Creatinine 0.82 0.52 - 1.04 mg/dL    GFR Estimate 74 >60 mL/min/1.7m2    GFR Estimate If Black 90 >60 mL/min/1.7m2    Calcium 9.3 8.5 - 10.1 mg/dL   Troponin I   Result Value Ref  Range    Troponin I ES <0.015 0.000 - 0.045 ug/L   INR   Result Value Ref Range    INR 1.02 0.86 - 1.14   Partial thromboplastin time   Result Value Ref Range    PTT 33 22 - 37 sec   UA reflex to Microscopic and Culture   Result Value Ref Range    Color Urine Light Yellow     Appearance Urine Clear     Glucose Urine Negative NEG^Negative mg/dL    Bilirubin Urine Negative NEG^Negative    Ketones Urine Negative NEG^Negative mg/dL    Specific Gravity Urine 1.003 1.003 - 1.035    Blood Urine Negative NEG^Negative    pH Urine 7.0 5.0 - 7.0 pH    Protein Albumin Urine Negative NEG^Negative mg/dL    Urobilinogen mg/dL Normal 0.0 - 2.0 mg/dL    Nitrite Urine Negative NEG^Negative    Leukocyte Esterase Urine Negative NEG^Negative    Source Midstream Urine    TSH with free T4 reflex   Result Value Ref Range    TSH 6.66 (H) 0.40 - 4.00 mU/L   T4 free   Result Value Ref Range    T4 Free 0.87 0.76 - 1.46 ng/dL   EKG 12 lead   Result Value Ref Range    Interpretation ECG Click View Image link to view waveform and result    Troponin POCT   Result Value Ref Range    Troponin I 0.00 0.00 - 0.10 ug/L        ROS:    Review Of Systems  Skin: negative  Eyes: negative  Ears/Nose/Throat: negative  Respiratory: No shortness of breath, dyspnea on exertion, cough, or hemoptysis  Cardiovascular: negative  Gastrointestinal: Mild nausea no vomiting, Indigestion  Genitourinary: negative  Musculoskeletal: negative  Neurologic: Dizziness  Psychiatric: negative  Hematologic/Lymphatic/Immunologic: negative  Endocrine: negative    10 point ROS negative other than the symptoms noted above.      Exam:    Vitals:  B/P: 112/45, T: 98.1, P: 59, R: 18    Constitutional: Obese, alert and no distress   Head: Normocephalic. No masses, lesions, tenderness or abnormalities   Neck: Neck supple. No adenopathy. Thyroid symmetric, normal size,, Carotids without bruits.   ENT: ENT exam normal, no neck nodes or sinus tenderness   Cardiovascular: RRR. No murmurs,  clicks gallops or rub   Respiratory: . Good diaphragmatic excursion. Lungs clear   Gastrointestinal: Abdomen soft,. BS normal. No masses, organomegaly.   : Deferred   Musculoskeletal: extremities normal- no gross deformities noted, gait normal and normal muscle tone   Skin: no suspicious lesions or rashes   Neurologic: Gait normal. Reflexes normal and symmetric. Sensation grossly WNL.   Psychiatric: mentation appears normal and affect normal/bright   Hematologic/Lymphatic/Immunologic: normal ant/post cervical, axillary, supraclavicular and inguinal     Consults: EP   FEN: NPO, IVF  DVT prophylaxis: Early ambulation  Disposition: Home once EP consult and Stress test completed.     Signed:  ANJALI Engle, NP  Emergency Department  [phone] Ex [id number]  September 12, 2018 at 12:45 PM

## 2018-09-12 NOTE — ED PROVIDER NOTES
[place] EMERGENCY DEPARTMENT ([facility])  9/12/18 ED 22   History     Chief Complaint   Patient presents with     Dizziness     The history is provided by the patient and medical records.     Socorro Del Cid is a 48-year-old female who presents the ER with complaints of near-syncopal type symptoms this morning while at work.  Patient states that during these episodes that she had no palpitations, no chest pain, no chest tightness, no shortness of breath and states she just felt felt weak all over.  Patient states she was sent to the ER for evaluation.  Patient states that the background history is that 2 days ago she underwent a palpitations evaluation at [place] where she was set up for a Holter monitor later on this week.  Patient states that she has intermittent palpitations but has not had any for 2 years.  Patient states she has never been diagnosed with any arrhythmia and did not have any palpitations during her episode of dizziness this morning.  Patient states that also she has had some intermittent chest burning and chest tightness.  Patient states that she thinks this is reflux disease and has tried to take some antacids for it in the past.  Patient states that it is substernal, intermittent, and sometimes exacerbated by drinking lemonade.  The patient states that this morning she had this episode of nonvertiginous dizziness and in retrospect believes there may have been some chest tightness associated with this episode.  Patient has no cardiac risk factors and has never had a cardiac stress test in the past.  Patient states she only had one cracker for breakfast this morning.    This part of the document was transcribed by Lexie Van Medical Scribe.     I have reviewed the Medications, Allergies, Past Medical and Surgical History, and Social History in the mDialog system.  PAST MEDICAL HISTORY: History reviewed. No pertinent past medical history.    PAST SURGICAL HISTORY:   Past Surgical  History:   Procedure Laterality Date     ABDOMEN SURGERY  1/01    Gastric bypass     HYSTERECTOMY VAGINAL, BILATERAL SALPINGO-OOPHERECTOMY, COMBINED         FAMILY HISTORY:   Family History   Problem Relation Age of Onset     Hypertension Mother      Diabetes Mother      Cancer Mother      uterine ca     Cancer Father      prostate ca     Lipids No family hx of      HEART DISEASE No family hx of        SOCIAL HISTORY:   Social History   Substance Use Topics     Smoking status: Never Smoker     Smokeless tobacco: Never Used     Alcohol use Yes      Comment: rarely       Patient's Medications    No medications on file        No Known Allergies     Review of Systems   Respiratory: Positive for chest tightness. Negative for shortness of breath.    Cardiovascular: Negative for chest pain and palpitations.   Neurological: Positive for weakness (generalized) and light-headedness. Negative for syncope.   All other systems reviewed and are negative.      Physical Exam   BP: 138/68  Pulse: 56  Temp: 98.1  F (36.7  C)  Resp: 18  Weight: 125.2 kg (276 lb)  SpO2: 99 %      Physical Exam   Constitutional: She is oriented to person, place, and time. No distress.   Alert conversant pleasant   HENT:   Head: Atraumatic.   Eyes: EOM are normal. Pupils are equal, round, and reactive to light.   Neck: Neck supple. No JVD present.   Cardiovascular: Regular rhythm and normal heart sounds.    Pulmonary/Chest: Breath sounds normal. She has no wheezes. She has no rales.   Abdominal: Soft.   Obese, nontender   Musculoskeletal: She exhibits no edema, tenderness or deformity.   Neurological: She is alert and oriented to person, place, and time. No cranial nerve deficit.   Grossly intact and symmetric   Skin: Skin is warm.   Psychiatric: She has a normal mood and affect.       ED Course     ED Course     Procedures          Patient's EKG revealed a sinus bradycardia at a rate of 57 with a AL interval 0.170 and a QRS duration of 0.112.  The  patient had a normal axis with no acute ST or T wave changes significant for ischemia but did have a early left bundle branch block pattern.  This was consistent with previous EKGs although previous EKGs was more consistent with a right bundle branch block pattern.  This was read by me personally.    Chest x-ray revealed no acute cardiopulmonary disease.    Patient had an IV placed for blood draw and had been placed on cardiac monitor and oximetry.    Results for orders placed or performed during the hospital encounter of 09/12/18   XR Chest 2 Views    Narrative    Exam: XR CHEST 2 VW, 9/12/2018 10:05 AM    Indication: Chest pain;     Comparison: 8/16/2014    Findings:   PA and lateral views of the chest. The trachea is midline. The  cardiomediastinal silhouette and pulmonary vasculature are within  normal limits. There are no focal airspace opacities. No pleural  effusions or pneumothorax. The upper abdomen is unremarkable. No acute  osseous lesions.      Impression    Impression: No acute cardiopulmonary disease.    CBC with platelets differential   Result Value Ref Range    WBC 5.3 4.0 - 11.0 10e9/L    RBC Count 4.88 3.8 - 5.2 10e12/L    Hemoglobin 14.1 11.7 - 15.7 g/dL    Hematocrit 43.0 35.0 - 47.0 %    MCV 88 78 - 100 fl    MCH 28.9 26.5 - 33.0 pg    MCHC 32.8 31.5 - 36.5 g/dL    RDW 14.4 10.0 - 15.0 %    Platelet Count 208 150 - 450 10e9/L    Diff Method Automated Method     % Neutrophils 49.6 %    % Lymphocytes 41.2 %    % Monocytes 7.9 %    % Eosinophils 0.9 %    % Basophils 0.4 %    % Immature Granulocytes 0.0 %    Nucleated RBCs 0 0 /100    Absolute Neutrophil 2.6 1.6 - 8.3 10e9/L    Absolute Lymphocytes 2.2 0.8 - 5.3 10e9/L    Absolute Monocytes 0.4 0.0 - 1.3 10e9/L    Absolute Eosinophils 0.1 0.0 - 0.7 10e9/L    Absolute Basophils 0.0 0.0 - 0.2 10e9/L    Abs Immature Granulocytes 0.0 0 - 0.4 10e9/L    Absolute Nucleated RBC 0.0    Basic metabolic panel   Result Value Ref Range    Sodium 144 133 - 144  mmol/L    Potassium 3.4 3.4 - 5.3 mmol/L    Chloride 108 94 - 109 mmol/L    Carbon Dioxide 28 20 - 32 mmol/L    Anion Gap 7 3 - 14 mmol/L    Glucose 86 70 - 99 mg/dL    Urea Nitrogen 8 7 - 30 mg/dL    Creatinine 0.82 0.52 - 1.04 mg/dL    GFR Estimate 74 >60 mL/min/1.7m2    GFR Estimate If Black 90 >60 mL/min/1.7m2    Calcium 9.3 8.5 - 10.1 mg/dL   Troponin I   Result Value Ref Range    Troponin I ES <0.015 0.000 - 0.045 ug/L   INR   Result Value Ref Range    INR 1.02 0.86 - 1.14   Partial thromboplastin time   Result Value Ref Range    PTT 33 22 - 37 sec   UA reflex to Microscopic and Culture   Result Value Ref Range    Color Urine Light Yellow     Appearance Urine Clear     Glucose Urine Negative NEG^Negative mg/dL    Bilirubin Urine Negative NEG^Negative    Ketones Urine Negative NEG^Negative mg/dL    Specific Gravity Urine 1.003 1.003 - 1.035    Blood Urine Negative NEG^Negative    pH Urine 7.0 5.0 - 7.0 pH    Protein Albumin Urine Negative NEG^Negative mg/dL    Urobilinogen mg/dL Normal 0.0 - 2.0 mg/dL    Nitrite Urine Negative NEG^Negative    Leukocyte Esterase Urine Negative NEG^Negative    Source Midstream Urine    TSH with free T4 reflex   Result Value Ref Range    TSH 6.66 (H) 0.40 - 4.00 mU/L   T4 free   Result Value Ref Range    T4 Free 0.87 0.76 - 1.46 ng/dL   EKG 12 lead   Result Value Ref Range    Interpretation ECG Click View Image link to view waveform and result    Troponin POCT   Result Value Ref Range    Troponin I 0.00 0.00 - 0.10 ug/L       The patient's complaints of chest tightness the patient was set up for a stress echo with a second troponin to be done at 1 PM today.  Stress echo at 3 PM.  In the process of waiting the patient did develop sinus bradycardia down as low as the low 40s and did become near syncopal with that heart rate.  Orthostatic vital signs were negative.    EP cardiology was called to consult on the patient and this is pending at this time.    At this time the patient will  be transferred up to the observation unit under chest pain protocol with a EP consult pending and her stress echo pending as well.        Assessments & Plan (with Medical Decision Making)     I have reviewed the nursing notes.    Medications   0.9% sodium chloride BOLUS (0 mLs Intravenous Stopped 9/12/18 1201)     Differential diagnosis on this patient includes sinus node disease and underlying ACS and therefore the patient will be transferred upstairs to the observation unit for cardiology consultation and cardiac stress testing.    I have reviewed the findings, diagnosis, and plan with the patient.    Final diagnoses:   Acute chest pain   Near syncope - with sinus bradycardia     Niels Jennings MD    9/12/2018   [facility], [place], EMERGENCY DEPARTMENT     Niels Jennings MD  09/12/18 6636

## 2018-09-12 NOTE — CONSULTS
Electrophysiology Consultation Note   EP Attending: .   Reason for consultation: bradycardia.   Provider requesting consultation: .  Date of Service: 9/12/2018      HPI:   Socorro Del Cid is a 48 year old female with a history of palpitations, Dysthymic disorder, obesity, gastric bypass, cholecystectomy, who presented to the ED with chest tightness and dizziness.  Patient was seen in the ED 2 days ago after waking up in the early morning with palpitations and her symptoms were gone by the time she got to the ED; EKG and tele showed no arrhthymias; troponin negative; she was discharge with recommendations to follow up with PCP which is scheduled in [place] on Friday. This morning, the patient was sitting at her desk when she felt lightheaded and like she might pass out so she came to the ED. She states that she did not have any palpitations this morning.  Patient states that she has infrequent episodes, a couple times a month or less, of feeling like she may pass out, nausea, and sweating since childhood. She is able to avoid passing out and the feeling will pass if she lays down. These episodes are worse with hot showers. She denies history of syncope. She denies family history of SCD. Denies pedal edema, dyspnea with exertion or rest, PND, or orthopnea. Review of telemetry and EKG shows sinus bradycardia with rates 50-70s. Stress echo today shows LVEF 60-65% at rest and no signs of ischemia.   Past Medical History:   Past Medical History:   Diagnosis Date     Dysthymic disorder 2006     Hypothyroid 2005     Past Surgical History:   Past Surgical History:   Procedure Laterality Date     ABDOMEN SURGERY  1/01    Gastric bypass     CHOLECYSTECTOMY       GASTRIC BYPASS  2001    w/byp; short james-en-y      HYSTERECTOMY VAGINAL, BILATERAL SALPINGO-OOPHERECTOMY, COMBINED       Allergies: Per MAR   No Known Allergies  Medications:   Per MAR current outpatient cardiovascular medications include: No  prescriptions prior to admission.     No current outpatient prescriptions on file.     Current Facility-Administered Medications   Medication Dose Route Frequency     [START ON 9/13/2018] aspirin  81 mg Oral Daily     pantoprazole (PROTONIX) IV  40 mg Intravenous BID AC     sodium chloride (PF)  3 mL Intracatheter Q8H     Family History:   Family History   Problem Relation Age of Onset     Hypertension Mother      Diabetes Mother      Cancer Mother      uterine ca     Cancer Father      prostate ca     Lipids No family hx of      HEART DISEASE No family hx of      Social History:   Social History   Substance Use Topics     Smoking status: Never Smoker     Smokeless tobacco: Never Used     Alcohol use Yes      Comment: rarely       ROS:   A comprehensive 10 point ROS was negative other than as mentioned in HPI.    Physical Examination:   VITALS: /72 (BP Location: Left arm)  Pulse 59  Temp 98.8  F (37.1  C) (Oral)  Resp 18  Wt 125.2 kg (276 lb)  SpO2 97%  BMI 48.89 kg/m2    GENERAL APPEARANCE: AxO, NAD   HEENT: NCAT, EOMI, MMM.   NECK: Supple. No JVD or bruit. Good carotid upstroke.   CHEST: CTAB   CARDIOVASCULAR: S1S2, Reg, No m/r/g.   ABDOMEN: BS+, soft, No pulsatile masses or bruits.   EXTREMITIES: No pedal edema. Distal pulses intact.   NEURO: Grossly nonfocal.   PSYCH: Normal affect.  SKIN: Warm and dry.   Data:   Labs:  BMP  Recent Labs  Lab 09/12/18  0857 09/10/18  0530    148*   POTASSIUM 3.4 3.7   CHLORIDE 108 110*   MARTY 9.3 9.4   CO2 28 27   BUN 8 11   CR 0.82 0.82   GLC 86 93     CBC  Recent Labs  Lab 09/12/18  0857 09/10/18  0530   WBC 5.3 5.6   RBC 4.88 4.71   HGB 14.1 13.2   HCT 43.0 41.2   MCV 88 88   MCH 28.9 28.0   MCHC 32.8 32.0   RDW 14.4 14.1    202     INR  Recent Labs  Lab 09/12/18  0857   INR 1.02     No results found for: CKTOTAL, CKMB, TROPN  No results found for: CHOL, CHOLHDLRATIO, HDL, LDL  EKG:      Assessment:   Socorro Del Cid is a 48 year old female with a history  of palpitations, Dysthymic disorder, obesity, gastric bypass, cholecystectomy, who presented to the ED with chest tightness and dizziness.  Patient was seen in the ED 2 days ago after waking up in the early morning with palpitations and her symptoms were gone by the time she got to the ED; EKG and tele showed no arrhthymias; troponin negative; she was discharge with recommendations to follow up with PCP which is scheduled in [place] on Friday. This morning, the patient was sitting at her desk when she felt lightheaded and like she might pass out so she came to the ED. She states that she did not have any palpitations this morning.  Patient states that she has infrequent episodes, a couple times a month or less, of feeling like she may pass out, nausea, and sweating since childhood. She is able to avoid passing out and the feeling will pass if she lays down. These episodes are worse with hot showers. She denies history of syncope. She denies family history of SCD. Denies pedal edema, dyspnea with exertion or rest, PND, or orthopnea. Review of telemetry and EKG shows sinus bradycardia with rates 50-70s. Stress echo today shows LVEF 60-65% at rest and no signs of ischemia.EP Recommendations:   #1. Vaovagal presyncope: Telemetry and EKG show normal sinus conduction and patient symptoms are consistent with vasovagal reflex.  1. We recommend that the patient stays well hydrated.  2. She should keep her appointment with her PCP on Friday.   3. If patient wishes to in the future, we can do a tilt table on an outpatient basis to help further evaluate her symptoms.  4. Patient is cleared from a cardiology standpoint to be discharged home.     The patient states understanding and is agreeable with plan.   Thank you for allowing us to participate in the care of this patient.     The patient was discussed w/ Dr. Bahena.  The above note reflects our joint plan.    ANJALI Davila CNP  Electrophysiology Consult  Service  Pager: [phone]    I have seen and examined the patient today as part of a shared visit with Celeste ALBA/CNP. I reviewed the lab, imaging and ECG data, as well as pertinent PMH and procedures. On exam today I found her CV exam to be entirely normal. Decision(s) made by me today include that her symptoms are very likely due to vasovagal near-syncope. Mainstay of therapy is hydration. We discussed possible head-up tilt table study and autonomic testing if her symptoms persist or worsen. Recommendations and plans were discussed at length with the team for completion.     Marlon Bahena MD

## 2018-09-12 NOTE — IP AVS SNAPSHOT
MRN:[id number]                      After Visit Summary   9/12/2018    Socorro Del Cid    MRN: [id number]           Thank you!     Thank you for choosing [facility] for your care. Our goal is always to provide you with excellent care. Hearing back from our patients is one way we can continue to improve our services. Please take a few minutes to complete the written survey that you may receive in the mail after you visit with us. Thank you!        Patient Information     Date Of Birth          1970        Designated Caregiver       Most Recent Value    Caregiver    Will someone help with your care after discharge? yes    Name of designated caregiver Nikolai    Phone number of caregiver [phone]    Caregiver address [phone], [place], [place]      About your hospital stay     You were admitted on:  September 12, 2018 You last received care in the:  Unit 6D Observation [facility]    You were discharged on:  September 12, 2018        Reason for your hospital stay       Chest pressure and dizziness.                  Who to Call     For medical emergencies, please call 911.  For non-urgent questions about your medical care, please call your primary care provider or clinic, None          Attending Provider     Provider Specialty    Niels Jennings MD Emergency Medicine    AureliaAnna chairez MD Emergency Medicine       Primary Care Provider Fax #    Physician No Ref-Primary [phone]       When to contact your care team       . Please go to your nearest emergency room If you were to have a change in the type of chest pain i.e more severe, lasting longer or radiating to your shoulder, arm, neck, jaw or back, shortness of breath or increased pain with breathing, coughing up blood, feel dizzy or lightheaded, or notice swelling in one leg.                  After Care Instructions     Activity       Your activity upon discharge: As tolerated            Diet       Follow this diet  upon discharge: Regular diet            Discharge Instructions       You were admitted to the ED observation unit at the [facility] for Dizziness and chest tightness. Troponins (a lab test to check if there were damage to the heart tissue) were checked and these were negative. Your stress test was normal. You received IV fluids with improvement of your symptoms. Your heart rate was noted to be in the 40's while you were here (normal heart rate 60-90) You were seen by the  cardiology specialists who recommended possible tilt testing as an outpatient. This can be discussed with your primary care doctor.  Please follow up with your primary care doctor on Friday and have holter monitor placed as previously scheduled.                  Follow-up Appointments     Adult [facility] Follow-up and recommended labs and tests       Follow up with PCP in one week, return to the ER if having chest pain, syncope, shortness of breath or fever greater than 101F.   Appointments on [place] and/or [facility] (with [facility] or [facility] provider or service). Call [phone] if you haven't heard regarding these appointments within 7 days of discharge.                  Your next 10 appointments already scheduled     Sep 14, 2018 11:00 AM CDT   Office Visit with ANJALI Pederson [facility] ([facility])    [address] [address] [phone]           Bring a current list of meds and any records pertaining to this visit. For Physicals, please bring immunization records and any forms needing to be filled out. Please arrive 10 minutes early to complete paperwork.              Pending Results     Date and Time Order Name Status Description    9/12/2018 0857 T4 free In process             Statement of Approval     Ordered          09/12/18 1755  I have reviewed and agree with all the recommendations and orders detailed in this document.  EFFECTIVE NOW     Approved and  "electronically signed by:  Marian Calloway APRN CNP             Admission Information     Date & Time Provider Department Dept. Phone    2018 Anna Moses MD Unit 6D Observation [facility] [phone]      Your Vitals Were     Blood Pressure Pulse Temperature Respirations Weight Pulse Oximetry    130/72 (BP Location: Left arm) 59 98.8  F (37.1  C) (Oral) 18 125.2 kg (276 lb) 97%    BMI (Body Mass Index)                   48.89 kg/m2           [employer] Information     [facility] [facility] lets you send messages to your doctor, view your test results, renew your prescriptions, schedule appointments and more. To sign up, go to [email or web address] [employer] [facility] . Click on \"Log in\" on the left side of the screen, which will take you to the Welcome page. Then click on \"Sign up Now\" on the right side of the page.     You will be asked to enter the access code listed below, as well as some personal information. Please follow the directions to create your username and password.     Your access code is: [email or web address]  Expires: 2018  6:12 AM     Your access code will  in 90 days. If you need help or a new code, please call your Mobile clinic or [phone].        Care EveryWhere ID     This is your Care EveryWhere ID. This could be used by other organizations to access your Mobile medical records  [id number]        Equal Access to Services     [facility] HospitalKELLY AH: Hadii qian pisanoo Sotanvir, waaxda luqadaha, qaybta kaalmada terryyada, gregory donald. So [facility] [place] [phone].    ATENCIÓN: Si habla español, tiene a chavis disposición servicios gratuitos de asistencia lingüística. Llame al [phone].    We comply with applicable federal civil rights laws and [place] laws. We do not discriminate on the basis of race, color, national origin, age, disability, sex, sexual orientation, or gender identity.               Review of your medicines      Notice     You have not been prescribed any " medications.             Protect others around you: Learn how to safely use, store and throw away your medicines at www.disposemymeds.org.             Medication List: This is a list of all your medications and when to take them. Check marks below indicate your daily home schedule. Keep this list as a reference.      Notice     You have not been prescribed any medications.

## 2018-09-14 ENCOUNTER — OFFICE VISIT (OUTPATIENT)
Dept: FAMILY MEDICINE | Facility: OTHER | Age: 48
End: 2018-09-14
Payer: COMMERCIAL

## 2018-09-14 VITALS
BODY MASS INDEX: 48.27 KG/M2 | WEIGHT: 272.4 LBS | RESPIRATION RATE: 18 BRPM | HEIGHT: 63 IN | DIASTOLIC BLOOD PRESSURE: 80 MMHG | TEMPERATURE: 97.9 F | SYSTOLIC BLOOD PRESSURE: 132 MMHG | HEART RATE: 56 BPM

## 2018-09-14 DIAGNOSIS — E03.8 SUBCLINICAL HYPOTHYROIDISM: ICD-10-CM

## 2018-09-14 DIAGNOSIS — Z12.31 ENCOUNTER FOR SCREENING MAMMOGRAM FOR BREAST CANCER: ICD-10-CM

## 2018-09-14 DIAGNOSIS — E66.01 MORBID OBESITY (H): ICD-10-CM

## 2018-09-14 DIAGNOSIS — F33.1 MODERATE EPISODE OF RECURRENT MAJOR DEPRESSIVE DISORDER (H): ICD-10-CM

## 2018-09-14 DIAGNOSIS — R00.2 PALPITATIONS: Primary | ICD-10-CM

## 2018-09-14 PROCEDURE — 99214 OFFICE O/P EST MOD 30 MIN: CPT | Performed by: STUDENT IN AN ORGANIZED HEALTH CARE EDUCATION/TRAINING PROGRAM

## 2018-09-14 RX ORDER — VENLAFAXINE HYDROCHLORIDE 37.5 MG/1
CAPSULE, EXTENDED RELEASE ORAL
Qty: 49 CAPSULE | Refills: 1 | Status: SHIPPED | OUTPATIENT
Start: 2018-09-14 | End: 2018-10-27

## 2018-09-14 ASSESSMENT — PATIENT HEALTH QUESTIONNAIRE - PHQ9
SUM OF ALL RESPONSES TO PHQ QUESTIONS 1-9: 15
SUM OF ALL RESPONSES TO PHQ QUESTIONS 1-9: 15
10. IF YOU CHECKED OFF ANY PROBLEMS, HOW DIFFICULT HAVE THESE PROBLEMS MADE IT FOR YOU TO DO YOUR WORK, TAKE CARE OF THINGS AT HOME, OR GET ALONG WITH OTHER PEOPLE: VERY DIFFICULT

## 2018-09-14 ASSESSMENT — PAIN SCALES - GENERAL: PAINLEVEL: NO PAIN (0)

## 2018-09-14 NOTE — MR AVS SNAPSHOT
After Visit Summary   9/14/2018    Socorro Del Cid    MRN: [id number]           Patient Information     Date Of Birth          1970        Visit Information        Provider Department      9/14/2018 11:00 AM Anna Culp APRN CNP [facility]        Today's Diagnoses     Screening for HIV (human immunodeficiency virus)    -  1    Syncope, unspecified syncope type        Subclinical hypothyroidism        Moderate episode of recurrent major depressive disorder (H)        Encounter for screening mammogram for breast cancer          Care Instructions    Recheck TSH in 6 weeks.    Start Effexor and increase the dose from 37.5 mg to 75 mg after 1-2 weeks if tolerating it well.    Follow up in 6 weeks for recheck of thyroid and Effexor.    Call to schedule mammogram in [place] or [place] - phone number [phone]    FLORENTINO Sorto            Follow-ups after your visit        Future tests that were ordered for you today     Open Future Orders        Priority Expected Expires Ordered    *MA Screening Digital Bilateral Routine  9/14/2019 9/14/2018    TSH with free T4 reflex Routine 10/26/2018 9/14/2019 9/14/2018            Who to contact     If you have questions or need follow up information about today's clinic visit or your schedule please contact [facility] directly at [phone].  Normal or non-critical lab and imaging results will be communicated to you by MyChart, letter or phone within 4 business days after the clinic has received the results. If you do not hear from us within 7 days, please contact the clinic through MyChart or phone. If you have a critical or abnormal lab result, we will notify you by phone as soon as possible.  Submit refill requests through [email or web address] or call your pharmacy and they will forward the refill request to us. Please allow 3 business days for your refill to be completed.          Additional Information About Your  "Visit        [employer] Information     [employer] lets you send messages to your doctor, view your test results, renew your prescriptions, schedule appointments and more. To sign up, go to [email or web address] . Click on \"Log in\" on the left side of the screen, which will take you to the Welcome page. Then click on \"Sign up Now\" on the right side of the page.     You will be asked to enter the access code listed below, as well as some personal information. Please follow the directions to create your username and password.     Your access code is: [email or web address]  Expires: 2018  6:12 AM     Your access code will  in 90 days. If you need help or a new code, please call your [facility] clinic or [phone].        Care EveryWhere ID     This is your Care EveryWhere ID. This could be used by other organizations to access your [place] medical records  [id number]        Your Vitals Were     Pulse Temperature Respirations Height BMI (Body Mass Index)       56 97.9  F (36.6  C) (Oral) 18 5' 3.35\" (1.609 m) 47.73 kg/m2        Blood Pressure from Last 3 Encounters:   18 132/80   18 130/72   09/10/18 151/90    Weight from Last 3 Encounters:   18 272 lb 6.4 oz (123.6 kg)   18 276 lb (125.2 kg)   09/10/18 276 lb (125.2 kg)                 Today's Medication Changes          These changes are accurate as of 18 11:29 AM.  If you have any questions, ask your nurse or doctor.               Start taking these medicines.        Dose/Directions    venlafaxine 37.5 MG 24 hr capsule   Commonly known as:  EFFEXOR-XR   Used for:  Moderate episode of recurrent major depressive disorder (H)   Started by:  Anna Culp APRN CNP        Take 1 capsule daily for 7-14 days, then take 2 capsules daily.   Quantity:  49 capsule   Refills:  1            Where to get your medicines      These medications were sent to [facility] [place] - [address] [place] [address]  " [address] [place] [address], [place] [address]     Phone:  [phone]     venlafaxine 37.5 MG 24 hr capsule                Primary Care Provider Fax #    Physician No Ref-Primary [phone]       No address on file        Equal Access to Services     BRITNEY ABEL : Hadii qian lamb aliyao Soesvinali, waaxda luqadaha, qaybta kaalmada adeegyada, gregory cornellin hayaan [facility] verna donald. So [facility] [phone].    ATENCIÓN: Si habla español, tiene a chavis disposición servicios gratuitos de asistencia lingüística. Llame al [phone].    We comply with applicable federal civil rights laws and [place] laws. We do not discriminate on the basis of race, color, national origin, age, disability, sex, sexual orientation, or gender identity.            Thank you!     Thank you for choosing [facility]  for your care. Our goal is always to provide you with excellent care. Hearing back from our patients is one way we can continue to improve our services. Please take a few minutes to complete the written survey that you may receive in the mail after your visit with us. Thank you!             Your Updated Medication List - Protect others around you: Learn how to safely use, store and throw away your medicines at www.disposemymeds.org.          This list is accurate as of 9/14/18 11:29 AM.  Always use your most recent med list.                   Brand Name Dispense Instructions for use Diagnosis    venlafaxine 37.5 MG 24 hr capsule    EFFEXOR-XR    49 capsule    Take 1 capsule daily for 7-14 days, then take 2 capsules daily.    Moderate episode of recurrent major depressive disorder (H)

## 2018-09-14 NOTE — LETTER
My Depression Action Plan  Name: Socorro Del Cid   Date of Birth 1970  Date: 9/14/2018    My doctor: No Ref-Primary, Physician   My clinic: [address] [address] [phone]          GREEN    ZONE   Good Control    What it looks like:     Things are going generally well. You have normal up s and down s. You may even feel depressed from time to time, but bad moods usually last less than a day.   What you need to do:  1. Continue to care for yourself (see self care plan)  2. Check your depression survival kit and update it as needed  3. Follow your physician s recommendations including any medication.  4. Do not stop taking medication unless you consult with your physician first.           YELLOW         ZONE Getting Worse    What it looks like:     Depression is starting to interfere with your life.     It may be hard to get out of bed; you may be starting to isolate yourself from others.    Symptoms of depression are starting to last most all day and this has happened for several days.     You may have suicidal thoughts but they are not constant.   What you need to do:     1. Call your care team, your response to treatment will improve if you keep your care team informed of your progress. Yellow periods are signs an adjustment may need to be made.     2. Continue your self-care, even if you have to fake it!    3. Talk to someone in your support network    4. Open up your depression survival kit           RED    ZONE Medical Alert - Get Help    What it looks like:     Depression is seriously interfering with your life.     You may experience these or other symptoms: You can t get out of bed most days, can t work or engage in other necessary activities, you have trouble taking care of basic hygiene, or basic responsibilities, thoughts of suicide or death that will not go away, self-injurious behavior.     What you need to do:  1. Call your care team  and request a same-day appointment. If they are not available (weekends or after hours) call your local crisis line, emergency room or 911.            Depression Self Care Plan / Survival Kit    Self-Care for Depression  Here s the deal. Your body and mind are really not as separate as most people think.  What you do and think affects how you feel and how you feel influences what you do and think. This means if you do things that people who feel good do, it will help you feel better.  Sometimes this is all it takes.  There is also a place for medication and therapy depending on how severe your depression is, so be sure to consult with your medical provider and/ or Behavioral Health Consultant if your symptoms are worsening or not improving.     In order to better manage my stress, I will:    Exercise  Get some form of exercise, every day. This will help reduce pain and release endorphins, the  feel good  chemicals in your brain. This is almost as good as taking antidepressants!  This is not the same as joining a gym and then never going! (they count on that by the way ) It can be as simple as just going for a walk or doing some gardening, anything that will get you moving.      Hygiene   Maintain good hygiene (Get out of bed in the morning, Make your bed, Brush your teeth, Take a shower, and Get dressed like you were going to work, even if you are unemployed).  If your clothes don't fit try to get ones that do.    Diet  I will strive to eat foods that are good for me, drink plenty of water, and avoid excessive sugar, caffeine, alcohol, and other mood-altering substances.  Some foods that are helpful in depression are: complex carbohydrates, B vitamins, flaxseed, fish or fish oil, fresh fruits and vegetables.    Psychotherapy  I agree to participate in Individual Therapy (if recommended).    Medication  If prescribed medications, I agree to take them.  Missing doses can result in serious side effects.  I understand  that drinking alcohol, or other illicit drug use, may cause potential side effects.  I will not stop my medication abruptly without first discussing it with my provider.    Staying Connected With Others  I will stay in touch with my friends, family members, and my primary care provider/team.    Use your imagination  Be creative.  We all have a creative side; it doesn t matter if it s oil painting, sand castles, or mud pies! This will also kick up the endorphins.    Witness Beauty  (AKA stop and smell the roses) Take a look outside, even in mid-winter. Notice colors, textures. Watch the squirrels and birds.     Service to others  Be of service to others.  There is always someone else in need.  By helping others we can  get out of ourselves  and remember the really important things.  This also provides opportunities for practicing all the other parts of the program.    Humor  Laugh and be silly!  Adjust your TV habits for less news and crime-drama and more comedy.    Control your stress  Try breathing deep, massage therapy, biofeedback, and meditation. Find time to relax each day.     My support system    Clinic Contact:  Phone number:    Contact 1:  Phone number:    Contact 2:  Phone number:    Confucianist/:  Phone number:    Therapist:  Phone number:    Local crisis center:    Phone number:    Other community support:  Phone number:

## 2018-09-14 NOTE — PATIENT INSTRUCTIONS
Recheck TSH in 6 weeks.    Start Effexor and increase the dose from 37.5 mg to 75 mg after 1-2 weeks if tolerating it well.    Follow up in 6 weeks for recheck of thyroid and Effexor.    Call to schedule mammogram in [facility] - phone number [phone]    FLORENTINO Sorto

## 2018-09-15 ASSESSMENT — PATIENT HEALTH QUESTIONNAIRE - PHQ9: SUM OF ALL RESPONSES TO PHQ QUESTIONS 1-9: 15

## 2018-09-19 ENCOUNTER — HOSPITAL ENCOUNTER (OUTPATIENT)
Dept: MAMMOGRAPHY | Facility: CLINIC | Age: 48
Discharge: HOME | End: 2018-09-19
Attending: STUDENT IN AN ORGANIZED HEALTH CARE EDUCATION/TRAINING PROGRAM | Admitting: STUDENT IN AN ORGANIZED HEALTH CARE EDUCATION/TRAINING PROGRAM
Payer: COMMERCIAL

## 2018-09-19 DIAGNOSIS — Z12.31 ENCOUNTER FOR SCREENING MAMMOGRAM FOR BREAST CANCER: ICD-10-CM

## 2018-09-19 PROCEDURE — 77067 SCR MAMMO BI INCL CAD: CPT

## 2018-10-19 NOTE — PROGRESS NOTES
SUBJECTIVE:   Socorro Del Cid is a 48 year old female who presents to clinic today for the following health issues:    History of Present Illness     Depression & Anxiety Follow-up:     Depression/Anxiety:  Depression only    Status since last visit::  Improved    Other associated symptoms of depression::  None    Significant life event::  No    Current substance use::  None    Anxiety/Panic symptoms::  No       Today's PHQ-9         PHQ-9 Total Score:     (P) 4   PHQ-9 Q9 Suicidal ideation:   (P) Not at all   Thoughts of suicide or self harm:      Self-harm Plan:        Self-harm Action:          Safety concerns for self or others:            Hypothyroidism:     Since last visit, patient describes the following symptoms::  None    Diet:  Regular (no restrictions)  Frequency of exercise:  1 day/week  Duration of exercise:  15-30 minutes  Taking medications regularly:  Yes  Medication side effects:  None  Additional concerns today:  No    PHQ-9 SCORE 9/14/2018 10/23/2018   Total Score MyChart 15 (Moderately severe depression) 4 (Minimal depression)   Total Score 15 4     TSH was 6.6 in September 2018 in ER. No further syncopal or presyncope episodes.    Unable to have cholesterol checked today as she had creamer in her coffee this morning  Flu vaccine - declined    Problem list and histories reviewed & adjusted, as indicated.  Additional history: as documented    Patient Active Problem List   Diagnosis     Morbid obesity (H)     Anemia     Dysthymic disorder     Excessive or frequent menstruation     Hypothyroidism     Past Surgical History:   Procedure Laterality Date     ABDOMEN SURGERY  1/01    Gastric bypass     CHOLECYSTECTOMY       GASTRIC BYPASS  2001    w/byp; short james-en-y      HYSTERECTOMY VAGINAL, BILATERAL SALPINGO-OOPHERECTOMY, COMBINED         Social History   Substance Use Topics     Smoking status: Never Smoker     Smokeless tobacco: Never Used     Alcohol use Yes      Comment: rarely     Family  "History   Problem Relation Age of Onset     Hypertension Mother      Diabetes Mother      Cancer Mother      uterine ca     Breast Cancer Mother      Hypothyroidism Mother      Depression Mother      Cancer Father      prostate ca     Depression Son      Depression Daughter      Hypothyroidism Maternal Grandmother      Lipids No family hx of      HEART DISEASE No family hx of          Current Outpatient Prescriptions   Medication Sig Dispense Refill     venlafaxine (EFFEXOR-XR) 37.5 MG 24 hr capsule Take 1 capsule daily for 7-14 days, then take 2 capsules daily. 49 capsule 1     No Known Allergies  BP Readings from Last 3 Encounters:   10/26/18 120/80   09/14/18 132/80   09/12/18 130/72    Wt Readings from Last 3 Encounters:   10/26/18 268 lb 4.8 oz (121.7 kg)   09/14/18 272 lb 6.4 oz (123.6 kg)   09/12/18 276 lb (125.2 kg)                  Labs reviewed in EPIC    ROS:  Constitutional, HEENT, cardiovascular, pulmonary, GI, , musculoskeletal, neuro, skin, endocrine and psych systems are negative, except as otherwise noted.    OBJECTIVE:     /80  Pulse 68  Temp 97.9  F (36.6  C) (Oral)  Resp 16  Ht 5' 3.35\" (1.609 m)  Wt 268 lb 4.8 oz (121.7 kg)  BMI 47.01 kg/m2  Body mass index is 47.01 kg/(m^2).  GENERAL: obese, alert and no distress  NECK: no adenopathy, no asymmetry, masses, or scars and thyroid normal to palpation  RESP: lungs clear to auscultation - no rales, rhonchi or wheezes  CV: regular rate and rhythm, normal S1 S2, no S3 or S4, no murmur, click or rub, no peripheral edema  MS: no gross musculoskeletal defects noted, no edema  SKIN: no suspicious lesions or rashes  PSYCH: mentation appears normal, affect normal/bright, judgement and insight intact and appearance well groomed    Diagnostic Test Results:  Results for orders placed or performed in visit on 10/26/18   TSH with free T4 reflex   Result Value Ref Range    TSH 4.56 (H) 0.40 - 4.00 mU/L   T4 free   Result Value Ref Range    T4 Free " 0.76 0.76 - 1.46 ng/dL         ASSESSMENT/PLAN:     1. Subclinical hypothyroidism  I called and discussed lab results with patient. She technically still meets diagnosis criteria for subclinical hypothyroidism.  She does admit to feeling more tired recently and feels that she can take a nap midmorning despite sleeping well the previous night.  She is also working ardently on losing weight.  We discussed treating the subclinical hypothyroidism with a small dose of levothyroxine and rechecking her levels in 3 months.  Patient is agreeable to this.  - TSH with free T4 reflex  - T4 free  - T4 free  - levothyroxine (SYNTHROID/LEVOTHROID) 25 MCG tablet; Take 1 tablet (25 mcg) by mouth daily  Dispense: 90 tablet; Refill: 0    2. Dysthymic disorder  Improved back on Effexor.  Refills of medications sent.  Next office visit in 1 year or sooner if needed.  - venlafaxine (EFFEXOR-XR) 75 MG 24 hr capsule; Take 1 capsule (75 mg) by mouth daily  Dispense: 90 capsule; Refill: 3    3. Morbid obesity (H)  Has lost 30 pounds in the last 9 months.  Hoping supplementation of thyroid hormone assist in her weight loss efforts.      ANJALI Norton [facility]  Answers for HPI/ROS submitted by the patient on 10/23/2018   If you checked off any problems, how difficult have these problems made it for you to do your work, take care of things at home, or get along with other people?: Somewhat difficult  PHQ9 TOTAL SCORE: 4  PHQ-2 Score: 2

## 2018-10-23 ASSESSMENT — PATIENT HEALTH QUESTIONNAIRE - PHQ9
SUM OF ALL RESPONSES TO PHQ QUESTIONS 1-9: 4
10. IF YOU CHECKED OFF ANY PROBLEMS, HOW DIFFICULT HAVE THESE PROBLEMS MADE IT FOR YOU TO DO YOUR WORK, TAKE CARE OF THINGS AT HOME, OR GET ALONG WITH OTHER PEOPLE: SOMEWHAT DIFFICULT
SUM OF ALL RESPONSES TO PHQ QUESTIONS 1-9: 4

## 2018-10-26 ENCOUNTER — OFFICE VISIT (OUTPATIENT)
Dept: FAMILY MEDICINE | Facility: OTHER | Age: 48
End: 2018-10-26
Payer: COMMERCIAL

## 2018-10-26 VITALS
HEIGHT: 63 IN | WEIGHT: 268.3 LBS | HEART RATE: 68 BPM | SYSTOLIC BLOOD PRESSURE: 120 MMHG | BODY MASS INDEX: 47.54 KG/M2 | DIASTOLIC BLOOD PRESSURE: 80 MMHG | RESPIRATION RATE: 16 BRPM | TEMPERATURE: 97.9 F

## 2018-10-26 DIAGNOSIS — E03.8 SUBCLINICAL HYPOTHYROIDISM: Primary | ICD-10-CM

## 2018-10-26 DIAGNOSIS — F34.1 DYSTHYMIC DISORDER: ICD-10-CM

## 2018-10-26 DIAGNOSIS — E66.01 MORBID OBESITY (H): ICD-10-CM

## 2018-10-26 LAB
T4 FREE SERPL-MCNC: 0.76 NG/DL (ref 0.76–1.46)
TSH SERPL DL<=0.005 MIU/L-ACNC: 4.56 MU/L (ref 0.4–4)

## 2018-10-26 PROCEDURE — 99214 OFFICE O/P EST MOD 30 MIN: CPT | Performed by: STUDENT IN AN ORGANIZED HEALTH CARE EDUCATION/TRAINING PROGRAM

## 2018-10-26 PROCEDURE — 84443 ASSAY THYROID STIM HORMONE: CPT | Performed by: STUDENT IN AN ORGANIZED HEALTH CARE EDUCATION/TRAINING PROGRAM

## 2018-10-26 PROCEDURE — 84439 ASSAY OF FREE THYROXINE: CPT | Performed by: STUDENT IN AN ORGANIZED HEALTH CARE EDUCATION/TRAINING PROGRAM

## 2018-10-26 PROCEDURE — 36415 COLL VENOUS BLD VENIPUNCTURE: CPT | Performed by: STUDENT IN AN ORGANIZED HEALTH CARE EDUCATION/TRAINING PROGRAM

## 2018-10-26 RX ORDER — VENLAFAXINE HYDROCHLORIDE 75 MG/1
75 CAPSULE, EXTENDED RELEASE ORAL DAILY
Qty: 90 CAPSULE | Refills: 3 | Status: SHIPPED | OUTPATIENT
Start: 2018-10-26 | End: 2019-04-19

## 2018-10-26 NOTE — MR AVS SNAPSHOT
"              After Visit Summary   10/26/2018    Socorro Del Cid    MRN: [id number]           Patient Information     Date Of Birth          1970        Visit Information        Provider Department      10/26/2018 7:40 AM Anna Culp APRN CNP [facility]        Today's Diagnoses     Subclinical hypothyroidism    -  1    Dysthymic disorder        Morbid obesity (H)           Follow-ups after your visit        Who to contact     If you have questions or need follow up information about today's clinic visit or your schedule please contact [facility] directly at [phone].  Normal or non-critical lab and imaging results will be communicated to you by [email or web address], letter or phone within 4 business days after the clinic has received the results. If you do not hear from us within 7 days, please contact the clinic through [email or web address] or phone. If you have a critical or abnormal lab result, we will notify you by phone as soon as possible.  Submit refill requests through [employer] or call your pharmacy and they will forward the refill request to us. Please allow 3 business days for your refill to be completed.          Additional Information About Your Visit        MyChart Information     [employer] gives you secure access to your electronic health record. If you see a primary care provider, you can also send messages to your care team and make appointments. If you have questions, please call your primary care clinic.  If you do not have a primary care provider, please call [phone] and they will assist you.        Care EveryWhere ID     This is your Care EveryWhere ID. This could be used by other organizations to access your [place] medical records  [id number]        Your Vitals Were     Pulse Temperature Respirations Height BMI (Body Mass Index)       68 97.9  F (36.6  C) (Oral) 16 5' 3.35\" (1.609 m) 47.01 kg/m2        Blood Pressure from Last 3 Encounters:   10/26/18 120/80 "   09/14/18 132/80   09/12/18 130/72    Weight from Last 3 Encounters:   10/26/18 268 lb 4.8 oz (121.7 kg)   09/14/18 272 lb 6.4 oz (123.6 kg)   09/12/18 276 lb (125.2 kg)              We Performed the Following     T4 free     T4 free     TSH with free T4 reflex          Today's Medication Changes          These changes are accurate as of 10/26/18 11:59 PM.  If you have any questions, ask your nurse or doctor.               Start taking these medicines.        Dose/Directions    levothyroxine 25 MCG tablet   Commonly known as:  SYNTHROID/LEVOTHROID   Used for:  Subclinical hypothyroidism   Started by:  Anna Culp APRN CNP        Dose:  25 mcg   Take 1 tablet (25 mcg) by mouth daily   Quantity:  90 tablet   Refills:  0       venlafaxine 75 MG 24 hr capsule   Commonly known as:  EFFEXOR-XR   Used for:  Dysthymic disorder   Started by:  Anna Culp APRN CNP        Dose:  75 mg   Take 1 capsule (75 mg) by mouth daily   Quantity:  90 capsule   Refills:  3            Where to get your medicines      These medications were sent to [facility] [place] - [address] [place] [address]  [address] [place] [address], [place] [address]     Phone:  [phone]     levothyroxine 25 MCG tablet    venlafaxine 75 MG 24 hr capsule                Primary Care Provider Office Phone # Fax #    ANJALI Pederson [phone] [address] [place] [address]        Equal Access to Services     [facility]: Hadii qian lamb hadasho Sotanvir, waaxda luqadaha, qaybta kaalmada adesean, gregory gillespie . So [facility] [phone].    ATENCIÓN: Si habla español, tiene a chavis disposición servicios gratuitos de asistencia lingüística. Llame al [phone].    We comply with applicable federal civil rights laws and [place] laws. We do not discriminate on the basis of race, color, national origin, age, disability,  sex, sexual orientation, or gender identity.            Thank you!     Thank you for choosing [facility]  for your care. Our goal is always to provide you with excellent care. Hearing back from our patients is one way we can continue to improve our services. Please take a few minutes to complete the written survey that you may receive in the mail after your visit with us. Thank you!             Your Updated Medication List - Protect others around you: Learn how to safely use, store and throw away your medicines at www.disposemymeds.org.          This list is accurate as of 10/26/18 11:59 PM.  Always use your most recent med list.                   Brand Name Dispense Instructions for use Diagnosis    levothyroxine 25 MCG tablet    SYNTHROID/LEVOTHROID    90 tablet    Take 1 tablet (25 mcg) by mouth daily    Subclinical hypothyroidism       venlafaxine 75 MG 24 hr capsule    EFFEXOR-XR    90 capsule    Take 1 capsule (75 mg) by mouth daily    Dysthymic disorder

## 2018-10-27 RX ORDER — LEVOTHYROXINE SODIUM 25 UG/1
25 TABLET ORAL DAILY
Qty: 90 TABLET | Refills: 0 | Status: SHIPPED | OUTPATIENT
Start: 2018-10-27 | End: 2019-04-19 | Stop reason: DRUGHIGH

## 2019-01-07 DIAGNOSIS — Z13.220 LIPID SCREENING: ICD-10-CM

## 2019-01-07 DIAGNOSIS — E03.8 SUBCLINICAL HYPOTHYROIDISM: ICD-10-CM

## 2019-01-07 DIAGNOSIS — E03.8 SUBCLINICAL HYPOTHYROIDISM: Primary | ICD-10-CM

## 2019-01-07 LAB
CHOLEST SERPL-MCNC: 181 MG/DL
HDLC SERPL-MCNC: 81 MG/DL
LDLC SERPL CALC-MCNC: 84 MG/DL
NONHDLC SERPL-MCNC: 100 MG/DL
T4 FREE SERPL-MCNC: 0.8 NG/DL (ref 0.76–1.46)
TRIGL SERPL-MCNC: 82 MG/DL
TSH SERPL DL<=0.005 MIU/L-ACNC: 4.84 MU/L (ref 0.4–4)

## 2019-01-07 PROCEDURE — 84439 ASSAY OF FREE THYROXINE: CPT | Performed by: STUDENT IN AN ORGANIZED HEALTH CARE EDUCATION/TRAINING PROGRAM

## 2019-01-07 PROCEDURE — 84443 ASSAY THYROID STIM HORMONE: CPT | Performed by: STUDENT IN AN ORGANIZED HEALTH CARE EDUCATION/TRAINING PROGRAM

## 2019-01-07 PROCEDURE — 80061 LIPID PANEL: CPT | Performed by: STUDENT IN AN ORGANIZED HEALTH CARE EDUCATION/TRAINING PROGRAM

## 2019-01-07 PROCEDURE — 36415 COLL VENOUS BLD VENIPUNCTURE: CPT | Performed by: STUDENT IN AN ORGANIZED HEALTH CARE EDUCATION/TRAINING PROGRAM

## 2019-01-07 RX ORDER — LEVOTHYROXINE SODIUM 50 UG/1
50 TABLET ORAL DAILY
Qty: 90 TABLET | Refills: 0 | Status: SHIPPED | OUTPATIENT
Start: 2019-01-07 | End: 2019-04-19 | Stop reason: DRUGHIGH

## 2019-04-18 ASSESSMENT — ENCOUNTER SYMPTOMS
ARTHRALGIAS: 0
CHILLS: 0
CONSTIPATION: 0
DYSURIA: 0
NERVOUS/ANXIOUS: 0
ABDOMINAL PAIN: 0
HEMATURIA: 0
HEADACHES: 0
BREAST MASS: 0
DIARRHEA: 0
JOINT SWELLING: 0
EYE PAIN: 0
SHORTNESS OF BREATH: 0
DIZZINESS: 0
COUGH: 0
FEVER: 0
PALPITATIONS: 0
PARESTHESIAS: 0
SORE THROAT: 0
HEMATOCHEZIA: 0
NAUSEA: 0
FREQUENCY: 1
MYALGIAS: 0
HEARTBURN: 1
WEAKNESS: 0

## 2019-04-18 ASSESSMENT — PATIENT HEALTH QUESTIONNAIRE - PHQ9
10. IF YOU CHECKED OFF ANY PROBLEMS, HOW DIFFICULT HAVE THESE PROBLEMS MADE IT FOR YOU TO DO YOUR WORK, TAKE CARE OF THINGS AT HOME, OR GET ALONG WITH OTHER PEOPLE: VERY DIFFICULT
SUM OF ALL RESPONSES TO PHQ QUESTIONS 1-9: 9
SUM OF ALL RESPONSES TO PHQ QUESTIONS 1-9: 9

## 2019-04-18 NOTE — PROGRESS NOTES
SUBJECTIVE:   CC: Socorro Del Cid is an 49 year old woman who presents for preventive health visit.     Healthy Habits:     Getting at least 3 servings of Calcium per day:  Yes    Bi-annual eye exam:  Yes    Dental care twice a year:  Yes    Sleep apnea or symptoms of sleep apnea:  None    Diet:  Regular (no restrictions)    Frequency of exercise:  2-3 days/week    Duration of exercise:  15-30 minutes    Taking medications regularly:  Yes    Medication side effects:  None    PHQ-2 Total Score: 3    Additional concerns today:  Yes    Depression Followup    Status since last visit: Worsened Mom passed away 4 weeks ago    See PHQ-9 for current symptoms.  Other associated symptoms: None    Complicating factors:   Significant life event:  Yes-  Mom passed away   Current substance abuse:  None  Anxiety or Panic symptoms:  No    PHQ 10/23/2018 10/26/2018 4/18/2019   PHQ-9 Total Score 4 7 9   Q9: Thoughts of better off dead/self-harm past 2 weeks Not at all Not at all Not at all     PHQ-9  English  PHQ-9   Any Language  Suicide Assessment Five-step Evaluation and Treatment (SAFE-T)  Hypothyroidism Follow-up      Since last visit, patient describes the following symptoms: dry skin, loose stools and fatigue      Today's PHQ-2 Score:   PHQ-2 ( 1999 [employer]) 4/18/2019   Q1: Little interest or pleasure in doing things 1   Q2: Feeling down, depressed or hopeless 2   PHQ-2 Score 3   Q1: Little interest or pleasure in doing things Several days   Q2: Feeling down, depressed or hopeless More than half the days   PHQ-2 Score 3       Abuse: Current or Past(Physical, Sexual or Emotional)- No  Do you feel safe in your environment? Yes    Social History     Tobacco Use     Smoking status: Never Smoker     Smokeless tobacco: Never Used   Substance Use Topics     Alcohol use: Yes     Comment: rarely         Alcohol Use 4/18/2019   Prescreen: >3 drinks/day or >7 drinks/week? No   No flowsheet data found.    Reviewed orders with patient.   Reviewed health maintenance and updated orders accordingly - Yes  Labs reviewed in EPIC  BP Readings from Last 3 Encounters:   04/19/19 120/82   10/26/18 120/80   09/14/18 132/80    Wt Readings from Last 3 Encounters:   04/19/19 115.1 kg (253 lb 12.8 oz)   10/26/18 121.7 kg (268 lb 4.8 oz)   09/14/18 123.6 kg (272 lb 6.4 oz)                  Patient Active Problem List   Diagnosis     Morbid obesity (H)     Anemia     Dysthymic disorder     Excessive or frequent menstruation     Hypothyroidism     Moderate episode of recurrent major depressive disorder (H)     Past Surgical History:   Procedure Laterality Date     ABDOMEN SURGERY  1/01    Gastric bypass     CHOLECYSTECTOMY       GASTRIC BYPASS  2001    w/byp; short james-en-y      HYSTERECTOMY VAGINAL, BILATERAL SALPINGO-OOPHERECTOMY, COMBINED       HYSTERECTOMY, PAP NO LONGER INDICATED         Social History     Tobacco Use     Smoking status: Never Smoker     Smokeless tobacco: Never Used   Substance Use Topics     Alcohol use: Yes     Comment: rarely     Family History   Problem Relation Age of Onset     Hypertension Mother      Diabetes Mother      Cancer Mother         uterine ca     Breast Cancer Mother      Hypothyroidism Mother      Depression Mother      Cancer Father         prostate ca     Depression Son      Depression Daughter      Hypothyroidism Maternal Grandmother      Lipids No family hx of      Heart Disease No family hx of          Current Outpatient Medications   Medication Sig Dispense Refill     levothyroxine (SYNTHROID/LEVOTHROID) 50 MCG tablet Take 1 tablet (50 mcg) by mouth daily 90 tablet 0     venlafaxine (EFFEXOR-XR) 75 MG 24 hr capsule Take 1 capsule (75 mg) by mouth daily 90 capsule 3     No Known Allergies    Mammogram Screening: Patient under age 50, mutual decision reflected in health maintenance.      Pertinent mammograms are reviewed under the imaging tab.  Pap - history of hysterectomy, benign     Reviewed and updated as needed  "this visit by clinical staff  Tobacco  Allergies  Meds  Med Hx  Surg Hx  Fam Hx  Soc Hx        Reviewed and updated as needed this visit by Provider        Past Medical History:   Diagnosis Date     Dysthymic disorder 2006     Hypothyroid 2005      Past Surgical History:   Procedure Laterality Date     ABDOMEN SURGERY  1/01    Gastric bypass     CHOLECYSTECTOMY       GASTRIC BYPASS  2001    w/byp; short james-en-y      HYSTERECTOMY VAGINAL, BILATERAL SALPINGO-OOPHERECTOMY, COMBINED       HYSTERECTOMY, PAP NO LONGER INDICATED         Review of Systems  CONSTITUTIONAL: POSITIVE for fatigue NEGATIVE for fever, chills, change in weight  INTEGUMENTARU/SKIN: POSITIVE for dry skin NEGATIVE for worrisome rashes, moles or lesions  EYES: NEGATIVE for vision changes or irritation  ENT: NEGATIVE for ear, mouth and throat problems  RESP: NEGATIVE for significant cough or SOB  BREAST: NEGATIVE for masses, tenderness or discharge  CV: NEGATIVE for chest pain, palpitations or peripheral edema  GI: POSITIVE for loose stools NEGATIVE for nausea, abdominal pain, heartburn  : NEGATIVE for unusual urinary or vaginal symptoms. Periods are regular.  MUSCULOSKELETAL: NEGATIVE for significant arthralgias or myalgia  NEURO: NEGATIVE for weakness, dizziness or paresthesias  PSYCHIATRIC: NEGATIVE for changes in mood or affect     OBJECTIVE:   /82   Pulse 70   Temp 97.1  F (36.2  C) (Temporal)   Resp 16   Ht 1.62 m (5' 3.78\")   Wt 115.1 kg (253 lb 12.8 oz)   BMI 43.87 kg/m    Physical Exam  GENERAL: healthy, alert and no distress  EYES: Eyes grossly normal to inspection, PERRL and conjunctivae and sclerae normal  HENT: ear canals and TM's normal, nose and mouth without ulcers or lesions  NECK: no adenopathy, no asymmetry, masses, or scars and thyroid normal to palpation  RESP: lungs clear to auscultation - no rales, rhonchi or wheezes  BREAST: normal without masses, tenderness or nipple discharge and no palpable axillary " masses or adenopathy  CV: regular rate and rhythm, normal S1 S2, no S3 or S4, no murmur, click or rub, no peripheral edema  ABDOMEN: soft, nontender, no hepatosplenomegaly, no masses and bowel sounds normal  MS: no gross musculoskeletal defects noted, no edema  SKIN: no suspicious lesions or rashes  NEURO: Normal strength and tone, mentation intact and speech normal  PSYCH: mentation appears normal, affect normal/bright    Diagnostic Test Results:  Results for orders placed or performed in visit on 04/19/19 (from the past 24 hour(s))   TSH with free T4 reflex   Result Value Ref Range    TSH 4.00 0.40 - 4.00 mU/L       ASSESSMENT/PLAN:   1. Encounter for routine adult health examination without abnormal findings  See notes    2. Subclinical hypothyroidism  TSH is right at the top end of normal. Because patient is morbidly obese and diligently working on weight loss, will increase dose of levothyroxine to 75 mcg daily and recheck TSH in 6 weeks.   - TSH with free T4 reflex  - TSH with free T4 reflex FUTURE  - levothyroxine (SYNTHROID/LEVOTHROID) 75 MCG tablet; Take 1 tablet (75 mcg) by mouth daily  Dispense: 90 tablet; Refill: 0    3. Morbid obesity (H)  Is on Weight Watchers and exercising. Has lost 11 lbs in the past 5 months. Encouraged to continue efforts.    4. Moderate episode of recurrent major depressive disorder (H)  Situationally worsened with mom's unexpected passing. She is doing okay and has supportive family and friend. Declines dose increase on Effexor. Is have dry mouth as side effect of Effexor but declines switching to different medication.  - venlafaxine (EFFEXOR-XR) 75 MG 24 hr capsule; Take 1 capsule (75 mg) by mouth daily  Dispense: 90 capsule; Refill: 3    COUNSELING:  Reviewed preventive health counseling, as reflected in patient instructions       Regular exercise       Healthy diet/nutrition    BP Readings from Last 1 Encounters:   04/19/19 120/82     Estimated body mass index is 43.87 kg/m   "as calculated from the following:    Height as of this encounter: 1.62 m (5' 3.78\").    Weight as of this encounter: 115.1 kg (253 lb 12.8 oz).      Weight management plan: Discussed healthy diet and exercise guidelines     reports that she has never smoked. She has never used smokeless tobacco.      Counseling Resources:  ATP IV Guidelines  Pooled Cohorts Equation Calculator  Breast Cancer Risk Calculator  FRAX Risk Assessment  ICSI Preventive Guidelines  Dietary Guidelines for Americans, 2010  USDA's MyPlate  ASA Prophylaxis  Lung CA Screening    [facility] CAZ[facility]  Answers for HPI/ROS submitted by the patient on 4/18/2019   Annual Exam:  If you checked off any problems, how difficult have these problems made it for you to do your work, take care of things at home, or get along with other people?: Very difficult  PHQ9 TOTAL SCORE: 9    "

## 2019-04-19 ENCOUNTER — OFFICE VISIT (OUTPATIENT)
Dept: FAMILY MEDICINE | Facility: OTHER | Age: 49
End: 2019-04-19
Payer: COMMERCIAL

## 2019-04-19 VITALS
RESPIRATION RATE: 16 BRPM | SYSTOLIC BLOOD PRESSURE: 120 MMHG | HEART RATE: 70 BPM | HEIGHT: 64 IN | WEIGHT: 253.8 LBS | DIASTOLIC BLOOD PRESSURE: 82 MMHG | BODY MASS INDEX: 43.33 KG/M2 | TEMPERATURE: 97.1 F

## 2019-04-19 DIAGNOSIS — Z00.00 ENCOUNTER FOR ROUTINE ADULT HEALTH EXAMINATION WITHOUT ABNORMAL FINDINGS: Primary | ICD-10-CM

## 2019-04-19 DIAGNOSIS — E66.01 MORBID OBESITY (H): ICD-10-CM

## 2019-04-19 DIAGNOSIS — F33.1 MODERATE EPISODE OF RECURRENT MAJOR DEPRESSIVE DISORDER (H): ICD-10-CM

## 2019-04-19 DIAGNOSIS — E03.8 SUBCLINICAL HYPOTHYROIDISM: ICD-10-CM

## 2019-04-19 DIAGNOSIS — F34.1 DYSTHYMIC DISORDER: ICD-10-CM

## 2019-04-19 LAB — TSH SERPL DL<=0.005 MIU/L-ACNC: 4 MU/L (ref 0.4–4)

## 2019-04-19 PROCEDURE — 36415 COLL VENOUS BLD VENIPUNCTURE: CPT | Performed by: STUDENT IN AN ORGANIZED HEALTH CARE EDUCATION/TRAINING PROGRAM

## 2019-04-19 PROCEDURE — 84443 ASSAY THYROID STIM HORMONE: CPT | Performed by: STUDENT IN AN ORGANIZED HEALTH CARE EDUCATION/TRAINING PROGRAM

## 2019-04-19 PROCEDURE — 99396 PREV VISIT EST AGE 40-64: CPT | Performed by: STUDENT IN AN ORGANIZED HEALTH CARE EDUCATION/TRAINING PROGRAM

## 2019-04-19 RX ORDER — VENLAFAXINE HYDROCHLORIDE 75 MG/1
75 CAPSULE, EXTENDED RELEASE ORAL DAILY
Qty: 90 CAPSULE | Refills: 3 | Status: SHIPPED | OUTPATIENT
Start: 2019-04-19 | End: 2020-03-18

## 2019-04-19 RX ORDER — LEVOTHYROXINE SODIUM 75 UG/1
75 TABLET ORAL DAILY
Qty: 90 TABLET | Refills: 0 | Status: SHIPPED | OUTPATIENT
Start: 2019-04-19 | End: 2019-07-24

## 2019-04-19 ASSESSMENT — ANXIETY QUESTIONNAIRES
3. WORRYING TOO MUCH ABOUT DIFFERENT THINGS: SEVERAL DAYS
6. BECOMING EASILY ANNOYED OR IRRITABLE: SEVERAL DAYS
4. TROUBLE RELAXING: NOT AT ALL
7. FEELING AFRAID AS IF SOMETHING AWFUL MIGHT HAPPEN: NOT AT ALL
2. NOT BEING ABLE TO STOP OR CONTROL WORRYING: SEVERAL DAYS
1. FEELING NERVOUS, ANXIOUS, OR ON EDGE: SEVERAL DAYS
7. FEELING AFRAID AS IF SOMETHING AWFUL MIGHT HAPPEN: NOT AT ALL
GAD7 TOTAL SCORE: 4
5. BEING SO RESTLESS THAT IT IS HARD TO SIT STILL: NOT AT ALL
GAD7 TOTAL SCORE: 4

## 2019-04-19 ASSESSMENT — MIFFLIN-ST. JEOR: SCORE: 1757.74

## 2019-04-19 NOTE — LETTER
My Depression Action Plan  Name: Socorro Del Cid   Date of Birth 1970  Date: 4/18/2019    My doctor: Anna Culp   My clinic: [facility]  [id number] [place] [id number] [phone]          GREEN    ZONE   Good Control    What it looks like:     Things are going generally well. You have normal up s and down s. You may even feel depressed from time to time, but bad moods usually last less than a day.   What you need to do:  1. Continue to care for yourself (see self care plan)  2. Check your depression survival kit and update it as needed  3. Follow your physician s recommendations including any medication.  4. Do not stop taking medication unless you consult with your physician first.           YELLOW         ZONE Getting Worse    What it looks like:     Depression is starting to interfere with your life.     It may be hard to get out of bed; you may be starting to isolate yourself from others.    Symptoms of depression are starting to last most all day and this has happened for several days.     You may have suicidal thoughts but they are not constant.   What you need to do:     1. Call your care team, your response to treatment will improve if you keep your care team informed of your progress. Yellow periods are signs an adjustment may need to be made.     2. Continue your self-care, even if you have to fake it!    3. Talk to someone in your support network    4. Open up your depression survival kit           RED    ZONE Medical Alert - Get Help    What it looks like:     Depression is seriously interfering with your life.     You may experience these or other symptoms: You can t get out of bed most days, can t work or engage in other necessary activities, you have trouble taking care of basic hygiene, or basic responsibilities, thoughts of suicide or death that will not go away, self-injurious behavior.     What you need to do:  1. Call your care team  and request a same-day appointment. If they are not available (weekends or after hours) call your local crisis line, emergency room or 911.            Depression Self Care Plan / Survival Kit    Self-Care for Depression  Here s the deal. Your body and mind are really not as separate as most people think.  What you do and think affects how you feel and how you feel influences what you do and think. This means if you do things that people who feel good do, it will help you feel better.  Sometimes this is all it takes.  There is also a place for medication and therapy depending on how severe your depression is, so be sure to consult with your medical provider and/ or Behavioral Health Consultant if your symptoms are worsening or not improving.     In order to better manage my stress, I will:    Exercise  Get some form of exercise, every day. This will help reduce pain and release endorphins, the  feel good  chemicals in your brain. This is almost as good as taking antidepressants!  This is not the same as joining a gym and then never going! (they count on that by the way ) It can be as simple as just going for a walk or doing some gardening, anything that will get you moving.      Hygiene   Maintain good hygiene (Get out of bed in the morning, Make your bed, Brush your teeth, Take a shower, and Get dressed like you were going to work, even if you are unemployed).  If your clothes don't fit try to get ones that do.    Diet  I will strive to eat foods that are good for me, drink plenty of water, and avoid excessive sugar, caffeine, alcohol, and other mood-altering substances.  Some foods that are helpful in depression are: complex carbohydrates, B vitamins, flaxseed, fish or fish oil, fresh fruits and vegetables.    Psychotherapy  I agree to participate in Individual Therapy (if recommended).    Medication  If prescribed medications, I agree to take them.  Missing doses can result in serious side effects.  I understand  that drinking alcohol, or other illicit drug use, may cause potential side effects.  I will not stop my medication abruptly without first discussing it with my provider.    Staying Connected With Others  I will stay in touch with my friends, family members, and my primary care provider/team.    Use your imagination  Be creative.  We all have a creative side; it doesn t matter if it s oil painting, sand castles, or mud pies! This will also kick up the endorphins.    Witness Beauty  (AKA stop and smell the roses) Take a look outside, even in mid-winter. Notice colors, textures. Watch the squirrels and birds.     Service to others  Be of service to others.  There is always someone else in need.  By helping others we can  get out of ourselves  and remember the really important things.  This also provides opportunities for practicing all the other parts of the program.    Humor  Laugh and be silly!  Adjust your TV habits for less news and crime-drama and more comedy.    Control your stress  Try breathing deep, massage therapy, biofeedback, and meditation. Find time to relax each day.     My support system    Clinic Contact:  Phone number:    Contact 1:  Phone number:    Contact 2:  Phone number:    [employer]/:  Phone number:    Therapist:  Phone number:    Local crisis center:    Phone number:    Other community support:  Phone number:

## 2019-04-19 NOTE — LETTER
My Depression Action Plan  Name: Socorro Del Cid   Date of Birth 1970  Date: 4/19/2019    My doctor: Anna Culp   My clinic: [facility]  [id number] [place] [id number] [phone]          GREEN    ZONE   Good Control    What it looks like:     Things are going generally well. You have normal up s and down s. You may even feel depressed from time to time, but bad moods usually last less than a day.   What you need to do:  1. Continue to care for yourself (see self care plan)  2. Check your depression survival kit and update it as needed  3. Follow your physician s recommendations including any medication.  4. Do not stop taking medication unless you consult with your physician first.           YELLOW         ZONE Getting Worse    What it looks like:     Depression is starting to interfere with your life.     It may be hard to get out of bed; you may be starting to isolate yourself from others.    Symptoms of depression are starting to last most all day and this has happened for several days.     You may have suicidal thoughts but they are not constant.   What you need to do:     1. Call your care team, your response to treatment will improve if you keep your care team informed of your progress. Yellow periods are signs an adjustment may need to be made.     2. Continue your self-care, even if you have to fake it!    3. Talk to someone in your support network    4. Open up your depression survival kit           RED    ZONE Medical Alert - Get Help    What it looks like:     Depression is seriously interfering with your life.     You may experience these or other symptoms: You can t get out of bed most days, can t work or engage in other necessary activities, you have trouble taking care of basic hygiene, or basic responsibilities, thoughts of suicide or death that will not go away, self-injurious behavior.     What you need to do:  1. Call your care team  and request a same-day appointment. If they are not available (weekends or after hours) call your local crisis line, emergency room or 911.            Depression Self Care Plan / Survival Kit    Self-Care for Depression  Here s the deal. Your body and mind are really not as separate as most people think.  What you do and think affects how you feel and how you feel influences what you do and think. This means if you do things that people who feel good do, it will help you feel better.  Sometimes this is all it takes.  There is also a place for medication and therapy depending on how severe your depression is, so be sure to consult with your medical provider and/ or Behavioral Health Consultant if your symptoms are worsening or not improving.     In order to better manage my stress, I will:    Exercise  Get some form of exercise, every day. This will help reduce pain and release endorphins, the  feel good  chemicals in your brain. This is almost as good as taking antidepressants!  This is not the same as joining a gym and then never going! (they count on that by the way ) It can be as simple as just going for a walk or doing some gardening, anything that will get you moving.      Hygiene   Maintain good hygiene (Get out of bed in the morning, Make your bed, Brush your teeth, Take a shower, and Get dressed like you were going to work, even if you are unemployed).  If your clothes don't fit try to get ones that do.    Diet  I will strive to eat foods that are good for me, drink plenty of water, and avoid excessive sugar, caffeine, alcohol, and other mood-altering substances.  Some foods that are helpful in depression are: complex carbohydrates, B vitamins, flaxseed, fish or fish oil, fresh fruits and vegetables.    Psychotherapy  I agree to participate in Individual Therapy (if recommended).    Medication  If prescribed medications, I agree to take them.  Missing doses can result in serious side effects.  I understand  that drinking alcohol, or other illicit drug use, may cause potential side effects.  I will not stop my medication abruptly without first discussing it with my provider.    Staying Connected With Others  I will stay in touch with my friends, family members, and my primary care provider/team.    Use your imagination  Be creative.  We all have a creative side; it doesn t matter if it s oil painting, sand castles, or mud pies! This will also kick up the endorphins.    Witness Beauty  (AKA stop and smell the roses) Take a look outside, even in mid-winter. Notice colors, textures. Watch the squirrels and birds.     Service to others  Be of service to others.  There is always someone else in need.  By helping others we can  get out of ourselves  and remember the really important things.  This also provides opportunities for practicing all the other parts of the program.    Humor  Laugh and be silly!  Adjust your TV habits for less news and crime-drama and more comedy.    Control your stress  Try breathing deep, massage therapy, biofeedback, and meditation. Find time to relax each day.     My support system    Clinic Contact:  Phone number:    Contact 1:  Phone number:    Contact 2:  Phone number:    [place]/:  Phone number:    Therapist:  Phone number:    Local crisis center:    Phone number:    Other community support:  Phone number:

## 2019-04-20 ASSESSMENT — ANXIETY QUESTIONNAIRES: GAD7 TOTAL SCORE: 4

## 2019-07-23 DIAGNOSIS — E03.8 SUBCLINICAL HYPOTHYROIDISM: ICD-10-CM

## 2019-07-23 LAB — TSH SERPL DL<=0.005 MIU/L-ACNC: 1.77 MU/L (ref 0.4–4)

## 2019-07-23 PROCEDURE — 36415 COLL VENOUS BLD VENIPUNCTURE: CPT | Performed by: STUDENT IN AN ORGANIZED HEALTH CARE EDUCATION/TRAINING PROGRAM

## 2019-07-23 PROCEDURE — 84443 ASSAY THYROID STIM HORMONE: CPT | Performed by: STUDENT IN AN ORGANIZED HEALTH CARE EDUCATION/TRAINING PROGRAM

## 2019-07-24 DIAGNOSIS — E03.8 SUBCLINICAL HYPOTHYROIDISM: ICD-10-CM

## 2019-07-24 RX ORDER — LEVOTHYROXINE SODIUM 75 UG/1
75 TABLET ORAL DAILY
Qty: 90 TABLET | Refills: 3 | Status: SHIPPED | OUTPATIENT
Start: 2019-07-24 | End: 2020-03-18

## 2019-11-08 ENCOUNTER — HEALTH MAINTENANCE LETTER (OUTPATIENT)
Age: 49
End: 2019-11-08

## 2020-03-09 ENCOUNTER — OFFICE VISIT (OUTPATIENT)
Dept: URGENT CARE | Facility: URGENT CARE | Age: 50
End: 2020-03-09
Payer: COMMERCIAL

## 2020-03-09 VITALS
TEMPERATURE: 98.2 F | OXYGEN SATURATION: 99 % | HEART RATE: 64 BPM | DIASTOLIC BLOOD PRESSURE: 74 MMHG | SYSTOLIC BLOOD PRESSURE: 114 MMHG

## 2020-03-09 DIAGNOSIS — H66.91 ACUTE OTITIS MEDIA WITH PERFORATION, RIGHT: Primary | ICD-10-CM

## 2020-03-09 DIAGNOSIS — H72.91 ACUTE OTITIS MEDIA WITH PERFORATION, RIGHT: Primary | ICD-10-CM

## 2020-03-09 PROCEDURE — 99213 OFFICE O/P EST LOW 20 MIN: CPT | Performed by: NURSE PRACTITIONER

## 2020-03-09 ASSESSMENT — ENCOUNTER SYMPTOMS
GASTROINTESTINAL NEGATIVE: 1
RESPIRATORY NEGATIVE: 1
CONSTITUTIONAL NEGATIVE: 1
SINUS PAIN: 1
CARDIOVASCULAR NEGATIVE: 1
COUGH: 0
HEADACHES: 1
MUSCULOSKELETAL NEGATIVE: 1

## 2020-03-09 NOTE — PATIENT INSTRUCTIONS
Patient Education     Eardrum Rupture (Perforation)    Your eardrum is a thin membrane between your outer and middle ear. Sound waves entering your ear cause the membrane to vibrate. This helps you hear. An injury or infection can cause your eardrum to tear (rupture). This creates a hole (perforation) that may affect your hearing.  Causes of eardrum perforation  Causes of a ruptured eardrum include:    Pressure from an ear infection    Putting an object such as a cotton swab or pencil into the ear    A very loud noise such as a gunshot close to the ear    Rapid changes in air pressure. These can happen during scuba diving or traveling at high altitudes.    A slap or blow to the ear  When to go to the emergency room (ER)  Seek medical care right away if you:    Have severe pain, bleeding, or ringing in your ear.    Lose your hearing suddenly.    Become very dizzy for no reason.    Have an object lodged in your ear.  A ruptured eardrum from an ear infection usually isn't an emergency. In fact, the rupture often relieves pressure and pain. It usually heals within hours or days. But you should have the ear looked at by a healthcare provider within 24 hours.  What to expect in the ER  Your ear will be examined. Treatment will depend on how severe the damage is. Small holes often heal on their own. A small patch may be placed over a minor eardrum tear. Large tears may need to be repaired during an operation. If you are very dizzy or have severe hearing loss, you are likely to stay in the hospital for treatment for one or more days.  Don't clean inside the ear canal with cotton swabs or any other object.  Date Last Reviewed: 10/1/2016    7832-2645 [employer]. [address] [place] [address], [place] [address]. All rights reserved. This information is not intended as a substitute for professional medical care. Always follow your healthcare professional's instructions.           Patient Education     Middle Ear  Infection (Otitis Media) in Adults  What is a middle ear infection?  A middle ear infection occurs behind the eardrum. It is most often caused by a virus or bacteria. Most kids have at least one middle ear infection by the time they are 3 years old. But adults can also get them.  What causes middle ear infections?  Inflammation in the middle ear most often starts after you ve had a sore throat, cold, or other upper respiratory problem. The infection spreads to the middle ear and causes fluid buildup behind the eardrum.   What are the symptoms of a middle ear infection?  These are the most common symptoms of middle ear infections in adults:    Ear pain    Feeling of fullness in the hear    Fluid draining from the ear    Fever    Hearing loss  These symptoms may look like other conditions or health problems. Always talk with your healthcare provider for a diagnosis.  How is a middle ear infection diagnosed?  Your healthcare provider will review your health history and do a physical exam. He or she will check the outer ear and the eardrum using an otoscope. The otoscope is a lighted tool that lets the healthcare provider see inside the ear. A pneumatic otoscope blows a puff of air into the ear to test eardrum movement. When there is fluid or infection in the middle ear, movement is decreased.  Your provider may also do a tympanometry. This is a test that directs air and sound to the middle ear.  If you have ear infections often, your healthcare provider may suggest having a hearing test.  How is a middle ear infection treated?  Treatment will depend on your symptoms, age, and general health. It will also depend on how severe the condition is.  Treatment may include:    Antibiotics    Pain relievers    Placing small tubes in the eardrum for chronic ear infections   What are possible complications of a middle ear infection?  Untreated ear infections can lead to:    Infection in other parts of the head    Lasting  (permanent) hearing loss    Speech and language problems  Can middle ear infections be prevented?  Cold and allergy medicines don't seem to prevent ear infections. And currently there is no vaccine that can prevent the disease. But check with your healthcare provider and make sure your vaccines are up-to-date. Living in a home where cigarettes are smoked can increase the chances of ear infections.  Key points about middle ear infections    Middle ear infections can affect both children and adults.    Pain and fever can be the most common symptoms.    Without treatment, permanent hearing loss may happen.    Take antibiotics as prescribed and finish all of the prescription. This can help prevent antibiotic-resistant infections or incomplete treatment with the infection returning.    Next steps  Tips to help you get the most from a visit to your healthcare provider:    Know the reason for your visit and what you want to happen.    Before your visit, write down questions you want answered.    Bring someone with you to help you ask questions and remember what your provider tells you.    At the visit, write down the name of a new diagnosis, and any new medicines, treatments, or tests. Also write down any new instructions your provider gives you.    Know why a new medicine or treatment is prescribed, and how it will help you. Also know what the side effects are.    Ask if your condition can be treated in other ways.    Know why a test or procedure is recommended and what the results could mean.    Know what to expect if you do not take the medicine or have the test or procedure.    If you have a follow-up appointment, write down the date, time, and purpose for that visit.    Know how you can contact your provider if you have questions.      [id number] [employer]. [address] [place] [address], [place] [address]. All rights reserved. This information is not intended as a substitute for professional medical care.  Always follow your healthcare professional's instructions.

## 2020-03-09 NOTE — PROGRESS NOTES
HPI  Socorro Del Cid 50 year old presents with URI on Friday. Pain in left ear and sinus congestion are primary symptoms. Negative for cough or fever. Noticed blood on q-tip from right ear. Tried sudafed without relief. Laying on right side makes ear pain worse. Tylenol for sinus headache.     Review of Systems   Constitutional: Negative.    HENT: Positive for ear pain and sinus pain.    Respiratory: Negative.  Negative for cough.    Cardiovascular: Negative.    Gastrointestinal: Negative.    Genitourinary: Negative.    Musculoskeletal: Negative.    Neurological: Positive for headaches.     Past Medical History:   Diagnosis Date     Dysthymic disorder 2006     Hypothyroid 2005     Patient Active Problem List   Diagnosis     Morbid obesity (H)     Anemia     Dysthymic disorder     Excessive or frequent menstruation     Hypothyroidism     Moderate episode of recurrent major depressive disorder (H)      Past Surgical History:   Procedure Laterality Date     ABDOMEN SURGERY  1/01    Gastric bypass     CHOLECYSTECTOMY       GASTRIC BYPASS  2001    w/byp; short [id number]      HYSTERECTOMY VAGINAL, BILATERAL SALPINGO-OOPHERECTOMY, COMBINED       HYSTERECTOMY, PAP NO LONGER INDICATED       No Known Allergies    Current Outpatient Medications   Medication     levothyroxine (SYNTHROID/LEVOTHROID) 75 MCG tablet     venlafaxine (EFFEXOR-XR) 75 MG 24 hr capsule     No current facility-administered medications for this visit.          Physical Exam  Vitals signs and nursing note reviewed.   Constitutional:       General: She is in acute distress.      Comments: /74   Pulse 64   Temp 98.2  F (36.8  C) (Oral)   SpO2 99%        HENT:      Head: Normocephalic.      Right Ear: Tympanic membrane is injected and perforated.      Left Ear: Tympanic membrane normal.      Ears:      Comments: Small perforation noted at the 5 o'clock position on the right tympanic membrane.  There is a small amount of bloody drainage noted in the  external canal.     Nose: Mucosal edema and rhinorrhea present.      Mouth/Throat:      Pharynx: Uvula midline. Posterior oropharyngeal erythema present. No oropharyngeal exudate.   Eyes:      Conjunctiva/sclera: Conjunctivae normal.   Neck:      Musculoskeletal: Normal range of motion.   Cardiovascular:      Rate and Rhythm: Normal rate.      Heart sounds: Normal heart sounds.   Pulmonary:      Effort: Pulmonary effort is normal.      Breath sounds: Normal breath sounds.   Abdominal:      Tenderness: There is no abdominal tenderness.   Lymphadenopathy:      Cervical: Cervical adenopathy present.   Skin:     General: Skin is warm and dry.      Capillary Refill: Capillary refill takes less than 2 seconds.   Neurological:      General: No focal deficit present.      Mental Status: She is alert and oriented to person, place, and time.       Assessment:  1. Acute otitis media with perforation, right        Plan:  Orders Placed This Encounter     amoxicillin-clavulanate (AUGMENTIN) 875-125 MG tablet   Tylenol 1-2 tabs po q4h prn  Instructions regarding self-care of patient/child reviewed.   Written instructions provided in after visit summary and reviewed.  Patient instructed to see primary care provider for new or persistent symptoms.   Red flag symptoms reviewed and patient has been instructed to seek emergent care  Please contact pharmacy for medication questions.  Patient instructed to take medications as directed on package.      Juli Nicholas, DNP, APRN, CNP

## 2020-03-18 ENCOUNTER — TELEPHONE (OUTPATIENT)
Dept: FAMILY MEDICINE | Facility: OTHER | Age: 50
End: 2020-03-18

## 2020-03-18 ENCOUNTER — MYC REFILL (OUTPATIENT)
Dept: FAMILY MEDICINE | Facility: OTHER | Age: 50
End: 2020-03-18

## 2020-03-18 DIAGNOSIS — E03.8 SUBCLINICAL HYPOTHYROIDISM: ICD-10-CM

## 2020-03-18 DIAGNOSIS — F33.1 MODERATE EPISODE OF RECURRENT MAJOR DEPRESSIVE DISORDER (H): ICD-10-CM

## 2020-03-18 RX ORDER — LEVOTHYROXINE SODIUM 75 UG/1
75 TABLET ORAL DAILY
Qty: 90 TABLET | Refills: 1 | Status: SHIPPED | OUTPATIENT
Start: 2020-03-18 | End: 2021-03-12

## 2020-03-18 RX ORDER — VENLAFAXINE HYDROCHLORIDE 75 MG/1
75 CAPSULE, EXTENDED RELEASE ORAL DAILY
Qty: 90 CAPSULE | Refills: 1 | Status: SHIPPED | OUTPATIENT
Start: 2020-03-18 | End: 2021-03-12

## 2020-03-18 NOTE — TELEPHONE ENCOUNTER
Please call patient using current workflow to determine if telephone or office visit is appropriate.  HAKEEM SortoC

## 2020-03-18 NOTE — TELEPHONE ENCOUNTER
Reason for call:  Patient reporting a symptom    Symptom or request: plugged ear pain    Duration (how long have symptoms been present): one week    Have you been treated for this before? No    Additional comments:     Phone Number patient can be reached at:  [phone]     Best Time:      Can we leave a detailed message on this number:  NO    Call taken on 3/18/2020 at 8:06 AM by Cass Thakkar

## 2020-03-19 NOTE — TELEPHONE ENCOUNTER
Spoke to patient  R ear is still plugged, intermittent pressure when she bends over or changes position  Clearing of throat, denies cough  No fever, no sore throat, no body aches  Sinus pain and pressure is almost resolved, much improved    Completing Augmentin today, 10 day course  She wonders if she can try to take a decongestant like sudafed to help with her ear  She had been taking Sudafed prior to her  visit on 3/9    Recommended trying to take sudafed now. If if is not helpful, recommending calling back to schedule a visit for ear recheck. She agrees with this plan and has no further questions. She will ask to speak to a nurse if she calls back.    Estefani Jones, BSN, RN, PHN

## 2020-05-05 ENCOUNTER — TRANSFERRED RECORDS (OUTPATIENT)
Dept: HEALTH INFORMATION MANAGEMENT | Facility: CLINIC | Age: 50
End: 2020-05-05

## 2020-12-06 ENCOUNTER — HEALTH MAINTENANCE LETTER (OUTPATIENT)
Age: 50
End: 2020-12-06

## 2021-03-04 NOTE — PROGRESS NOTES
Aissatou is a 51 year old who is being evaluated via a billable video visit.      How would you like to obtain your AVS? MyChart  If the video visit is dropped, the invitation should be resent by: Text to cell phone: [phone]  Will anyone else be joining your video visit? No    Video Start Time: 11:00 am    Assessment & Plan     Subclinical hypothyroidism  Had TSH checked recently in [place] and it was normal. Will get JARAD for [place] records when she does physical in the next couple months.   - levothyroxine (SYNTHROID/LEVOTHROID) 75 MCG tablet; Take 1 tablet (75 mcg) by mouth daily    Moderate episode of recurrent major depressive disorder (H)  Stable. Continue current medication(s) and dose(s).   - venlafaxine (EFFEXOR-XR) 75 MG 24 hr capsule; Take 1 capsule (75 mg) by mouth daily    Iron deficiency anemia, unspecified iron deficiency anemia type  Her doctor in [place] recommended she see GI to evaluate for GI cause for iron deficiency. She had Cologuard test done in [place] recently which was negative.   - GASTROENTEROLOGY ADULT REF CONSULT ONLY; Future    Morbid obesity (H)  Has not been losing weight recently.        No follow-ups on file.    ANJALI Norton CNP  [facility]   Aissatou is a 51 year old who presents for the following health issues     HPI       Depression Followup    How are you doing with your depression since your last visit? Improved.     Are you having other symptoms that might be associated with depression? No    Have you had a significant life event?  No     Are you feeling anxious or having panic attacks?   No    Do you have any concerns with your use of alcohol or other drugs? No     Family history breast cancer mother  Moved to [place] but will be back in [place] for 6 months.  Cologuard done in [place] and it was normal.  Mammogram done in [place] and it was normal.   Labs done in [place] and is iron deficient. Started on iron in January for deficiency once daily. Has  history of intermittent iron deficiency. Doctor in [place] recommended she see GI to assess for GI etiology.   Weight - was on Weight Watchers. Doesn't think she has lost more weight.       Social History     Tobacco Use     Smoking status: Never Smoker     Smokeless tobacco: Never Used   Substance Use Topics     Alcohol use: Yes     Comment: rarely     Drug use: No     PHQ 4/18/2019 4/19/2019 3/12/2021   PHQ-9 Total Score 9 7 6   Q9: Thoughts of better off dead/self-harm past 2 weeks Not at all Not at all Not at all     MARIJA-7 SCORE 4/19/2019   Total Score 4 (minimal anxiety)   Total Score 4     Last PHQ-9 3/12/2021   1.  Little interest or pleasure in doing things 1   2.  Feeling down, depressed, or hopeless 0   3.  Trouble falling or staying asleep, or sleeping too much 0   4.  Feeling tired or having little energy 1   5.  Poor appetite or overeating 1   6.  Feeling bad about yourself 1   7.  Trouble concentrating 1   8.  Moving slowly or restless 1   Q9: Thoughts of better off dead/self-harm past 2 weeks 0   PHQ-9 Total Score 6   Difficulty at work, home, or with people Somewhat difficult       Suicide Assessment Five-step Evaluation and Treatment (SAFE-T)    Hypothyroidism Follow-up      Since last visit, patient describes the following symptoms: dry skin      How many servings of fruits and vegetables do you eat daily?  2-3    On average, how many sweetened beverages do you drink each day (Examples: soda, juice, sweet tea, etc.  Do NOT count diet or artificially sweetened beverages)?   1    How many days per week do you exercise enough to make your heart beat faster? 3 or less    How many minutes a day do you exercise enough to make your heart beat faster? 10 - 19    How many days per week do you miss taking your medication? 0        Review of Systems   Constitutional, HEENT, cardiovascular, pulmonary, gi and gu systems are negative, except as otherwise noted.      Objective           Vitals:  No vitals were  obtained today due to virtual visit.    Physical Exam   GENERAL: Healthy, alert and no distress  EYES: Eyes grossly normal to inspection.  No discharge or erythema, or obvious scleral/conjunctival abnormalities.  RESP: No audible wheeze, cough, or visible cyanosis.  No visible retractions or increased work of breathing.    SKIN: Visible skin clear. No significant rash, abnormal pigmentation or lesions.  NEURO: Cranial nerves grossly intact.  Mentation and speech appropriate for age.  PSYCH: Mentation appears normal, affect normal/bright, judgement and insight intact, normal speech and appearance well-groomed.                Video-Visit Details    Type of service:  Video Visit    Video End Time:11:20 am    Originating Location (pt. Location): Home    Distant Location (provider location):  [facility]     Platform used for Video Visit: Loop

## 2021-03-12 ENCOUNTER — VIRTUAL VISIT (OUTPATIENT)
Dept: FAMILY MEDICINE | Facility: OTHER | Age: 51
End: 2021-03-12
Payer: COMMERCIAL

## 2021-03-12 DIAGNOSIS — D50.9 IRON DEFICIENCY ANEMIA, UNSPECIFIED IRON DEFICIENCY ANEMIA TYPE: ICD-10-CM

## 2021-03-12 DIAGNOSIS — E03.8 SUBCLINICAL HYPOTHYROIDISM: Primary | ICD-10-CM

## 2021-03-12 DIAGNOSIS — E66.01 MORBID OBESITY (H): ICD-10-CM

## 2021-03-12 DIAGNOSIS — F33.1 MODERATE EPISODE OF RECURRENT MAJOR DEPRESSIVE DISORDER (H): ICD-10-CM

## 2021-03-12 PROCEDURE — 99214 OFFICE O/P EST MOD 30 MIN: CPT | Mod: 95 | Performed by: STUDENT IN AN ORGANIZED HEALTH CARE EDUCATION/TRAINING PROGRAM

## 2021-03-12 RX ORDER — LEVOTHYROXINE SODIUM 75 UG/1
75 TABLET ORAL DAILY
Qty: 90 TABLET | Refills: 3 | Status: SHIPPED | OUTPATIENT
Start: 2021-03-12 | End: 2021-05-18

## 2021-03-12 RX ORDER — VENLAFAXINE HYDROCHLORIDE 75 MG/1
75 CAPSULE, EXTENDED RELEASE ORAL DAILY
Qty: 90 CAPSULE | Refills: 3 | Status: SHIPPED | OUTPATIENT
Start: 2021-03-12 | End: 2021-05-21

## 2021-03-12 ASSESSMENT — PATIENT HEALTH QUESTIONNAIRE - PHQ9: SUM OF ALL RESPONSES TO PHQ QUESTIONS 1-9: 6

## 2021-05-14 ENCOUNTER — TELEPHONE (OUTPATIENT)
Dept: FAMILY MEDICINE | Facility: OTHER | Age: 51
End: 2021-05-14

## 2021-05-14 NOTE — TELEPHONE ENCOUNTER
Prior Authorization Retail Medication Request    Medication/Dose: levothyroxine (SYNTHROID/LEVOTHROID) 75 MCG tablet  ICD code (if different than what is on RX):    Previously Tried and Failed:   Rationale:     Insurance Name:   Insurance ID:        Pharmacy Information (if different than what is on RX)  Name:    Phone:

## 2021-05-16 ASSESSMENT — ENCOUNTER SYMPTOMS
CHILLS: 0
SORE THROAT: 0
SHORTNESS OF BREATH: 1
EYE PAIN: 0
HEMATOCHEZIA: 0
HEADACHES: 0
JOINT SWELLING: 0
WEAKNESS: 0
DYSURIA: 0
ABDOMINAL PAIN: 1
COUGH: 0
CONSTIPATION: 0
DIZZINESS: 0
NAUSEA: 1
PARESTHESIAS: 0
ARTHRALGIAS: 0
BREAST MASS: 0
HEARTBURN: 0
FREQUENCY: 0
FEVER: 0
PALPITATIONS: 0
MYALGIAS: 0
DIARRHEA: 0
NERVOUS/ANXIOUS: 0
HEMATURIA: 0

## 2021-05-17 NOTE — PROGRESS NOTES
SUBJECTIVE:   CC: Socorro Del Cid is an 51 year old woman who presents for preventive health visit.   Patient has been advised of split billing requirements and indicates understanding: Yes  Healthy Habits:     Getting at least 3 servings of Calcium per day:  NO    Bi-annual eye exam:  Yes    Dental care twice a year:  Yes    Sleep apnea or symptoms of sleep apnea:  None    Diet:  Regular (no restrictions)    Frequency of exercise:  None    Taking medications regularly:  Yes    Medication side effects:  Not applicable    PHQ-2 Total Score: 0    Additional concerns today:  No    Anemic when blood work was done in [place] last fall. Was started on iron supplement once daily. Doctor in [place] recommended GI consult.     Sinuses have been bothering her, pressure and pain in cheeks and congestion.   Has had shortness of breath. Has had episodes where it feels difficult to take a deep breath and feels short of breath when that occurs. This will occur at rest.  No chest pain or activity intolerance.     Went off venlafaxine - insurance was taking too long to approve PA and decided to stop taking it. Has been feeling fine off of it.     Shingrix series done in [place]    Today's PHQ-2 Score:   PHQ-2 ( 1999 Pfizer) 5/16/2021   Q1: Little interest or pleasure in doing things 0   Q2: Feeling down, depressed or hopeless 0   PHQ-2 Score 0   Q1: Little interest or pleasure in doing things Not at all   Q2: Feeling down, depressed or hopeless Not at all   PHQ-2 Score 0       Abuse: Current or Past (Physical, Sexual or Emotional) - No  Do you feel safe in your environment? No    Have you ever done Advance Care Planning? (For example, a Health Directive, POLST, or a discussion with a medical provider or your loved ones about your wishes): No, advance care planning information given to patient to review.  Patient plans to discuss their wishes with loved ones or provider.      Social History     Tobacco Use     Smoking status: Never Smoker      Smokeless tobacco: Never Used   Substance Use Topics     Alcohol use: Yes     Comment: rarely     If you drink alcohol do you typically have >3 drinks per day or >7 drinks per week? No    Alcohol Use 5/16/2021   Prescreen: >3 drinks/day or >7 drinks/week? No   Prescreen: >3 drinks/day or >7 drinks/week? -       Reviewed orders with patient.  Reviewed health maintenance and updated orders accordingly - Yes  Lab work is in process  Labs reviewed in EPIC  BP Readings from Last 3 Encounters:   05/18/21 132/68   03/09/20 114/74   04/19/19 120/82    Wt Readings from Last 3 Encounters:   05/18/21 123.5 kg (272 lb 4 oz)   04/19/19 115.1 kg (253 lb 12.8 oz)   10/26/18 121.7 kg (268 lb 4.8 oz)                  Patient Active Problem List   Diagnosis     Morbid obesity (H)     Anemia     Dysthymic disorder     Excessive or frequent menstruation     Hypothyroidism     Moderate episode of recurrent major depressive disorder (H)     Past Surgical History:   Procedure Laterality Date     ABDOMEN SURGERY  1/01    Gastric bypass     CHOLECYSTECTOMY       GASTRIC BYPASS  2001    w/byp; short james-en-y      HYSTERECTOMY VAGINAL, BILATERAL SALPINGO-OOPHERECTOMY, COMBINED       HYSTERECTOMY, PAP NO LONGER INDICATED         Social History     Tobacco Use     Smoking status: Never Smoker     Smokeless tobacco: Never Used   Substance Use Topics     Alcohol use: Yes     Comment: rarely     Family History   Problem Relation Age of Onset     Hypertension Mother      Diabetes Mother      Cancer Mother         uterine ca     Breast Cancer Mother      Hypothyroidism Mother      Depression Mother      Cancer Father         prostate ca     Depression Son      Depression Daughter      Hypothyroidism Maternal Grandmother      Lipids No family hx of      Heart Disease No family hx of          Current Outpatient Medications   Medication Sig Dispense Refill     amoxicillin-clavulanate (AUGMENTIN) 875-125 MG tablet Take 1 tablet by mouth 2 times  daily for 10 days 20 tablet 0     levothyroxine (SYNTHROID/LEVOTHROID) 75 MCG tablet Take 1 tablet (75 mcg) by mouth daily 90 tablet 3     venlafaxine (EFFEXOR-XR) 75 MG 24 hr capsule Take 1 capsule (75 mg) by mouth daily 90 capsule 3     No Known Allergies  Recent Labs   Lab Test 05/18/21  1726 07/23/19  1558 04/19/19  1051 01/07/19  0736 09/12/18  0857 09/12/18  0857 09/10/18  0530   LDL  --   --   --  84  --   --   --    HDL  --   --   --  81  --   --   --    TRIG  --   --   --  82  --   --   --    ALT 19  --   --   --   --   --  21   CR 0.80  --   --   --   --  0.82 0.82   GFRESTIMATED 85  --   --   --   --  74 74   GFRESTBLACK >90  --   --   --   --  90 89   POTASSIUM 3.6  --   --   --   --  3.4 3.7   TSH  --  1.77 4.00 4.84*   < > 6.66*  --     < > = values in this interval not displayed.        Breast Cancer Screening:    FSH-7:   Breast CA Risk Assessment (FHS-7) 5/16/2021   Did any of your first-degree relatives have breast or ovarian cancer? Yes   Did any of your relatives have bilateral breast cancer? No   Did any man in your family have breast cancer? No   Did any woman in your family have breast and ovarian cancer? No   Did any woman in your family have breast cancer before age 50 y? No   Do you have 2 or more relatives with breast and/or ovarian cancer? Yes   Do you have 2 or more relatives with breast and/or bowel cancer? No       Mammogram Screening: Recommended annual mammography  Pertinent mammograms are reviewed under the imaging tab.    History of abnormal Pap smear: Status post benign hysterectomy. Health Maintenance and Surgical History updated.     Reviewed and updated as needed this visit by clinical staff  Tobacco  Allergies  Meds   Med Hx  Surg Hx  Fam Hx  Soc Hx        Reviewed and updated as needed this visit by Provider                Past Medical History:   Diagnosis Date     Dysthymic disorder 2006     Hypothyroid 2005      Past Surgical History:   Procedure Laterality Date      ABDOMEN SURGERY  1/01    Gastric bypass     CHOLECYSTECTOMY       GASTRIC BYPASS  2001    w/byp; short james-en-y      HYSTERECTOMY VAGINAL, BILATERAL SALPINGO-OOPHERECTOMY, COMBINED       HYSTERECTOMY, PAP NO LONGER INDICATED         Review of Systems   Constitutional: Negative for chills and fever.   HENT: Positive for congestion. Negative for ear pain, hearing loss and sore throat.    Eyes: Negative for pain and visual disturbance.   Respiratory: Positive for shortness of breath. Negative for cough.    Cardiovascular: Negative for chest pain, palpitations and peripheral edema.   Gastrointestinal: Positive for abdominal pain and nausea. Negative for constipation, diarrhea, heartburn and hematochezia.   Breasts:  Negative for tenderness, breast mass and discharge.   Genitourinary: Negative for dysuria, frequency, genital sores, hematuria, pelvic pain, urgency, vaginal bleeding and vaginal discharge.   Musculoskeletal: Negative for arthralgias, joint swelling and myalgias.   Skin: Negative for rash.   Neurological: Negative for dizziness, weakness, headaches and paresthesias.   Psychiatric/Behavioral: Negative for mood changes. The patient is not nervous/anxious.           OBJECTIVE:   /68   Pulse 55   Temp 97.8  F (36.6  C) (Temporal)   Wt 123.5 kg (272 lb 4 oz)   SpO2 100%   BMI 47.05 kg/m    Physical Exam  GENERAL APPEARANCE: alert, no distress and obese  EYES: Eyes grossly normal to inspection, PERRL and conjunctivae and sclerae normal  HENT: ear canals and TM's normal, nose and mouth without ulcers or lesions, oropharynx clear and oral mucous membranes moist  NECK: no adenopathy, no asymmetry, masses, or scars and thyroid normal to palpation  RESP: lungs clear to auscultation - no rales, rhonchi or wheezes  BREAST: patient declined   CV: regular rate and rhythm, normal S1 S2, no S3 or S4, no murmur, click or rub, no peripheral edema and peripheral pulses strong  ABDOMEN: soft, nontender, no  hepatosplenomegaly, no masses and bowel sounds normal   (female): patient declined, had normal Pap done in [place] in 10/2020  MS: no musculoskeletal defects are noted and gait is age appropriate without ataxia  SKIN: no suspicious lesions or rashes  NEURO: Normal strength and tone, sensory exam grossly normal, mentation intact and speech normal  PSYCH: mentation appears normal and affect normal/bright    Diagnostic Test Results:  Labs reviewed in Epic  Results for orders placed or performed in visit on 05/18/21 (from the past 24 hour(s))   CBC with platelets   Result Value Ref Range    WBC 7.7 4.0 - 11.0 10e9/L    RBC Count 4.68 3.8 - 5.2 10e12/L    Hemoglobin 13.9 11.7 - 15.7 g/dL    Hematocrit 43.1 35.0 - 47.0 %    MCV 92 78 - 100 fl    MCH 29.7 26.5 - 33.0 pg    MCHC 32.3 31.5 - 36.5 g/dL    RDW 15.4 (H) 10.0 - 15.0 %    Platelet Count 232 150 - 450 10e9/L   Comprehensive metabolic panel (BMP + Alb, Alk Phos, ALT, AST, Total. Bili, TP)   Result Value Ref Range    Sodium 141 133 - 144 mmol/L    Potassium 3.6 3.4 - 5.3 mmol/L    Chloride 108 94 - 109 mmol/L    Carbon Dioxide 29 20 - 32 mmol/L    Anion Gap 4 3 - 14 mmol/L    Glucose 89 70 - 99 mg/dL    Urea Nitrogen 6 (L) 7 - 30 mg/dL    Creatinine 0.80 0.52 - 1.04 mg/dL    GFR Estimate 85 >60 mL/min/[1.73_m2]    GFR Estimate If Black >90 >60 mL/min/[1.73_m2]    Calcium 8.9 8.5 - 10.1 mg/dL    Bilirubin Total 0.5 0.2 - 1.3 mg/dL    Albumin 3.6 3.4 - 5.0 g/dL    Protein Total 6.7 (L) 6.8 - 8.8 g/dL    Alkaline Phosphatase 71 40 - 150 U/L    ALT 19 0 - 50 U/L    AST 16 0 - 45 U/L   Iron and iron binding capacity   Result Value Ref Range    Iron 62 35 - 180 ug/dL    Iron Binding Cap 367 240 - 430 ug/dL    Iron Saturation Index 17 15 - 46 %   Ferritin   Result Value Ref Range    Ferritin 20 8 - 252 ng/mL   Vitamin B12   Result Value Ref Range    Vitamin B12 609 193 - 986 pg/mL   INR   Result Value Ref Range    INR 0.99 0.86 - 1.14   D dimer, quantitative   Result Value  Ref Range    D Dimer 0.3 0.0 - 0.50 ug/ml FEU       ASSESSMENT/PLAN:   1. Routine general medical examination at a health care facility  See notes    2. Subclinical hypothyroidism  TSH checked in [place] and was normal last fall.   - levothyroxine (SYNTHROID/LEVOTHROID) 75 MCG tablet; Take 1 tablet (75 mcg) by mouth daily  Dispense: 90 tablet; Refill: 3    3. Moderate episode of recurrent major depressive disorder (H)  Stable off of venlafaxine. Stopped when insurance was taking too long to approve PA. Does not wish to restart.     4. Acute non-recurrent maxillary sinusitis  Will treat for symptoms consistent with sinus infection. Follow up if symptoms persist or worsen.   - amoxicillin-clavulanate (AUGMENTIN) 875-125 MG tablet; Take 1 tablet by mouth 2 times daily for 10 days  Dispense: 20 tablet; Refill: 0    5. Screening for diabetes mellitus  - Comprehensive metabolic panel (BMP + Alb, Alk Phos, ALT, AST, Total. Bili, TP)    6. Anemia, unspecified type  - CBC with platelets  - Iron and iron binding capacity  - Ferritin  - Vitamin B12    7. Special screening for malignant neoplasms, colon  - GASTROENTEROLOGY ADULT REF PROCEDURE ONLY; Future    8. SOB (shortness of breath)  Has had episodes where it feels difficult to take a deep breath and feels short of breath when that occurs. Chest x-ray, INR and d-dimer are normal. Unclear if this is occurring after shallow breathing for a period of time and then is taking deep breaths to oxygenate which induce anxiety and sensation of shortness of breath vs other cause. No red flag symptoms. Denies chest pain. Not tachycardic. O2 sats 100%. Recommend monitoring and if this progresses or develops new symptoms to follow up.   - XR Chest 2 Views; Future  - INR  - D dimer, quantitative    Patient has been advised of split billing requirements and indicates understanding: Yes  COUNSELING:  Reviewed preventive health counseling, as reflected in patient instructions       Regular  "exercise       Healthy diet/nutrition       Immunizations    Vaccinated for Shingrix in [place] in 2020             Colon cancer screening    Estimated body mass index is 47.05 kg/m  as calculated from the following:    Height as of 4/19/19: 1.62 m (5' 3.78\").    Weight as of this encounter: 123.5 kg (272 lb 4 oz).    Weight management plan: Discussed healthy diet and exercise guidelines    She reports that she has never smoked. She has never used smokeless tobacco.      Counseling Resources:  ATP IV Guidelines  Pooled Cohorts Equation Calculator  Breast Cancer Risk Calculator  BRCA-Related Cancer Risk Assessment: FHS-7 Tool  FRAX Risk Assessment  ICSI Preventive Guidelines  Dietary Guidelines for Americans, 2010  USDA's MyPlate  ASA Prophylaxis  Lung CA Screening    Anna Culp, ANJALI CNP  [facility]  "

## 2021-05-17 NOTE — PATIENT INSTRUCTIONS

## 2021-05-17 NOTE — H&P (VIEW-ONLY)
SUBJECTIVE:   CC: Socorro Del Cid is an 51 year old woman who presents for preventive health visit.   Patient has been advised of split billing requirements and indicates understanding: Yes  Healthy Habits:     Getting at least 3 servings of Calcium per day:  NO    Bi-annual eye exam:  Yes    Dental care twice a year:  Yes    Sleep apnea or symptoms of sleep apnea:  None    Diet:  Regular (no restrictions)    Frequency of exercise:  None    Taking medications regularly:  Yes    Medication side effects:  Not applicable    PHQ-2 Total Score: 0    Additional concerns today:  No    Anemic when blood work was done in [place] last fall. Was started on iron supplement once daily. Doctor in [place] recommended GI consult.     Sinuses have been bothering her, pressure and pain in cheeks and congestion.   Has had shortness of breath. Has had episodes where it feels difficult to take a deep breath and feels short of breath when that occurs. This will occur at rest.  No chest pain or activity intolerance.     Went off venlafaxine - insurance was taking too long to approve PA and decided to stop taking it. Has been feeling fine off of it.     Shingrix series done in [place]    Today's PHQ-2 Score:   PHQ-2 ( 1999 Pfizer) 5/16/2021   Q1: Little interest or pleasure in doing things 0   Q2: Feeling down, depressed or hopeless 0   PHQ-2 Score 0   Q1: Little interest or pleasure in doing things Not at all   Q2: Feeling down, depressed or hopeless Not at all   PHQ-2 Score 0       Abuse: Current or Past (Physical, Sexual or Emotional) - No  Do you feel safe in your environment? No    Have you ever done Advance Care Planning? (For example, a Health Directive, POLST, or a discussion with a medical provider or your loved ones about your wishes): No, advance care planning information given to patient to review.  Patient plans to discuss their wishes with loved ones or provider.      Social History     Tobacco Use     Smoking status: Never Smoker      Smokeless tobacco: Never Used   Substance Use Topics     Alcohol use: Yes     Comment: rarely     If you drink alcohol do you typically have >3 drinks per day or >7 drinks per week? No    Alcohol Use 5/16/2021   Prescreen: >3 drinks/day or >7 drinks/week? No   Prescreen: >3 drinks/day or >7 drinks/week? -       Reviewed orders with patient.  Reviewed health maintenance and updated orders accordingly - Yes  Lab work is in process  Labs reviewed in EPIC  BP Readings from Last 3 Encounters:   05/18/21 132/68   03/09/20 114/74   04/19/19 120/82    Wt Readings from Last 3 Encounters:   05/18/21 123.5 kg (272 lb 4 oz)   04/19/19 115.1 kg (253 lb 12.8 oz)   10/26/18 121.7 kg (268 lb 4.8 oz)                  Patient Active Problem List   Diagnosis     Morbid obesity (H)     Anemia     Dysthymic disorder     Excessive or frequent menstruation     Hypothyroidism     Moderate episode of recurrent major depressive disorder (H)     Past Surgical History:   Procedure Laterality Date     ABDOMEN SURGERY  1/01    Gastric bypass     CHOLECYSTECTOMY       GASTRIC BYPASS  2001    w/byp; short james-en-y      HYSTERECTOMY VAGINAL, BILATERAL SALPINGO-OOPHERECTOMY, COMBINED       HYSTERECTOMY, PAP NO LONGER INDICATED         Social History     Tobacco Use     Smoking status: Never Smoker     Smokeless tobacco: Never Used   Substance Use Topics     Alcohol use: Yes     Comment: rarely     Family History   Problem Relation Age of Onset     Hypertension Mother      Diabetes Mother      Cancer Mother         uterine ca     Breast Cancer Mother      Hypothyroidism Mother      Depression Mother      Cancer Father         prostate ca     Depression Son      Depression Daughter      Hypothyroidism Maternal Grandmother      Lipids No family hx of      Heart Disease No family hx of          Current Outpatient Medications   Medication Sig Dispense Refill     amoxicillin-clavulanate (AUGMENTIN) 875-125 MG tablet Take 1 tablet by mouth 2 times  daily for 10 days 20 tablet 0     levothyroxine (SYNTHROID/LEVOTHROID) 75 MCG tablet Take 1 tablet (75 mcg) by mouth daily 90 tablet 3     venlafaxine (EFFEXOR-XR) 75 MG 24 hr capsule Take 1 capsule (75 mg) by mouth daily 90 capsule 3     No Known Allergies  Recent Labs   Lab Test 05/18/21  1726 07/23/19  1558 04/19/19  1051 01/07/19  0736 09/12/18  0857 09/12/18  0857 09/10/18  0530   LDL  --   --   --  84  --   --   --    HDL  --   --   --  81  --   --   --    TRIG  --   --   --  82  --   --   --    ALT 19  --   --   --   --   --  21   CR 0.80  --   --   --   --  0.82 0.82   GFRESTIMATED 85  --   --   --   --  74 74   GFRESTBLACK >90  --   --   --   --  90 89   POTASSIUM 3.6  --   --   --   --  3.4 3.7   TSH  --  1.77 4.00 4.84*   < > 6.66*  --     < > = values in this interval not displayed.        Breast Cancer Screening:    FSH-7:   Breast CA Risk Assessment (FHS-7) 5/16/2021   Did any of your first-degree relatives have breast or ovarian cancer? Yes   Did any of your relatives have bilateral breast cancer? No   Did any man in your family have breast cancer? No   Did any woman in your family have breast and ovarian cancer? No   Did any woman in your family have breast cancer before age 50 y? No   Do you have 2 or more relatives with breast and/or ovarian cancer? Yes   Do you have 2 or more relatives with breast and/or bowel cancer? No       Mammogram Screening: Recommended annual mammography  Pertinent mammograms are reviewed under the imaging tab.    History of abnormal Pap smear: Status post benign hysterectomy. Health Maintenance and Surgical History updated.     Reviewed and updated as needed this visit by clinical staff  Tobacco  Allergies  Meds   Med Hx  Surg Hx  Fam Hx  Soc Hx        Reviewed and updated as needed this visit by Provider                Past Medical History:   Diagnosis Date     Dysthymic disorder 2006     Hypothyroid 2005      Past Surgical History:   Procedure Laterality Date      ABDOMEN SURGERY  1/01    Gastric bypass     CHOLECYSTECTOMY       GASTRIC BYPASS  2001    w/byp; short james-en-y      HYSTERECTOMY VAGINAL, BILATERAL SALPINGO-OOPHERECTOMY, COMBINED       HYSTERECTOMY, PAP NO LONGER INDICATED         Review of Systems   Constitutional: Negative for chills and fever.   HENT: Positive for congestion. Negative for ear pain, hearing loss and sore throat.    Eyes: Negative for pain and visual disturbance.   Respiratory: Positive for shortness of breath. Negative for cough.    Cardiovascular: Negative for chest pain, palpitations and peripheral edema.   Gastrointestinal: Positive for abdominal pain and nausea. Negative for constipation, diarrhea, heartburn and hematochezia.   Breasts:  Negative for tenderness, breast mass and discharge.   Genitourinary: Negative for dysuria, frequency, genital sores, hematuria, pelvic pain, urgency, vaginal bleeding and vaginal discharge.   Musculoskeletal: Negative for arthralgias, joint swelling and myalgias.   Skin: Negative for rash.   Neurological: Negative for dizziness, weakness, headaches and paresthesias.   Psychiatric/Behavioral: Negative for mood changes. The patient is not nervous/anxious.           OBJECTIVE:   /68   Pulse 55   Temp 97.8  F (36.6  C) (Temporal)   Wt 123.5 kg (272 lb 4 oz)   SpO2 100%   BMI 47.05 kg/m    Physical Exam  GENERAL APPEARANCE: alert, no distress and obese  EYES: Eyes grossly normal to inspection, PERRL and conjunctivae and sclerae normal  HENT: ear canals and TM's normal, nose and mouth without ulcers or lesions, oropharynx clear and oral mucous membranes moist  NECK: no adenopathy, no asymmetry, masses, or scars and thyroid normal to palpation  RESP: lungs clear to auscultation - no rales, rhonchi or wheezes  BREAST: patient declined   CV: regular rate and rhythm, normal S1 S2, no S3 or S4, no murmur, click or rub, no peripheral edema and peripheral pulses strong  ABDOMEN: soft, nontender, no  hepatosplenomegaly, no masses and bowel sounds normal   (female): patient declined, had normal Pap done in [place] in 10/2020  MS: no musculoskeletal defects are noted and gait is age appropriate without ataxia  SKIN: no suspicious lesions or rashes  NEURO: Normal strength and tone, sensory exam grossly normal, mentation intact and speech normal  PSYCH: mentation appears normal and affect normal/bright    Diagnostic Test Results:  Labs reviewed in Epic  Results for orders placed or performed in visit on 05/18/21 (from the past 24 hour(s))   CBC with platelets   Result Value Ref Range    WBC 7.7 4.0 - 11.0 10e9/L    RBC Count 4.68 3.8 - 5.2 10e12/L    Hemoglobin 13.9 11.7 - 15.7 g/dL    Hematocrit 43.1 35.0 - 47.0 %    MCV 92 78 - 100 fl    MCH 29.7 26.5 - 33.0 pg    MCHC 32.3 31.5 - 36.5 g/dL    RDW 15.4 (H) 10.0 - 15.0 %    Platelet Count 232 150 - 450 10e9/L   Comprehensive metabolic panel (BMP + Alb, Alk Phos, ALT, AST, Total. Bili, TP)   Result Value Ref Range    Sodium 141 133 - 144 mmol/L    Potassium 3.6 3.4 - 5.3 mmol/L    Chloride 108 94 - 109 mmol/L    Carbon Dioxide 29 20 - 32 mmol/L    Anion Gap 4 3 - 14 mmol/L    Glucose 89 70 - 99 mg/dL    Urea Nitrogen 6 (L) 7 - 30 mg/dL    Creatinine 0.80 0.52 - 1.04 mg/dL    GFR Estimate 85 >60 mL/min/[1.73_m2]    GFR Estimate If Black >90 >60 mL/min/[1.73_m2]    Calcium 8.9 8.5 - 10.1 mg/dL    Bilirubin Total 0.5 0.2 - 1.3 mg/dL    Albumin 3.6 3.4 - 5.0 g/dL    Protein Total 6.7 (L) 6.8 - 8.8 g/dL    Alkaline Phosphatase 71 40 - 150 U/L    ALT 19 0 - 50 U/L    AST 16 0 - 45 U/L   Iron and iron binding capacity   Result Value Ref Range    Iron 62 35 - 180 ug/dL    Iron Binding Cap 367 240 - 430 ug/dL    Iron Saturation Index 17 15 - 46 %   Ferritin   Result Value Ref Range    Ferritin 20 8 - 252 ng/mL   Vitamin B12   Result Value Ref Range    Vitamin B12 609 193 - 986 pg/mL   INR   Result Value Ref Range    INR 0.99 0.86 - 1.14   D dimer, quantitative   Result Value  Ref Range    D Dimer 0.3 0.0 - 0.50 ug/ml FEU       ASSESSMENT/PLAN:   1. Routine general medical examination at a health care facility  See notes    2. Subclinical hypothyroidism  TSH checked in [place] and was normal last fall.   - levothyroxine (SYNTHROID/LEVOTHROID) 75 MCG tablet; Take 1 tablet (75 mcg) by mouth daily  Dispense: 90 tablet; Refill: 3    3. Moderate episode of recurrent major depressive disorder (H)  Stable off of venlafaxine. Stopped when insurance was taking too long to approve PA. Does not wish to restart.     4. Acute non-recurrent maxillary sinusitis  Will treat for symptoms consistent with sinus infection. Follow up if symptoms persist or worsen.   - amoxicillin-clavulanate (AUGMENTIN) 875-125 MG tablet; Take 1 tablet by mouth 2 times daily for 10 days  Dispense: 20 tablet; Refill: 0    5. Screening for diabetes mellitus  - Comprehensive metabolic panel (BMP + Alb, Alk Phos, ALT, AST, Total. Bili, TP)    6. Anemia, unspecified type  - CBC with platelets  - Iron and iron binding capacity  - Ferritin  - Vitamin B12    7. Special screening for malignant neoplasms, colon  - GASTROENTEROLOGY ADULT REF PROCEDURE ONLY; Future    8. SOB (shortness of breath)  Has had episodes where it feels difficult to take a deep breath and feels short of breath when that occurs. Chest x-ray, INR and d-dimer are normal. Unclear if this is occurring after shallow breathing for a period of time and then is taking deep breaths to oxygenate which induce anxiety and sensation of shortness of breath vs other cause. No red flag symptoms. Denies chest pain. Not tachycardic. O2 sats 100%. Recommend monitoring and if this progresses or develops new symptoms to follow up.   - XR Chest 2 Views; Future  - INR  - D dimer, quantitative    Patient has been advised of split billing requirements and indicates understanding: Yes  COUNSELING:  Reviewed preventive health counseling, as reflected in patient instructions       Regular  "exercise       Healthy diet/nutrition       Immunizations    Vaccinated for Shingrix in [place] in 2020             Colon cancer screening    Estimated body mass index is 47.05 kg/m  as calculated from the following:    Height as of 4/19/19: 1.62 m (5' 3.78\").    Weight as of this encounter: 123.5 kg (272 lb 4 oz).    Weight management plan: Discussed healthy diet and exercise guidelines    She reports that she has never smoked. She has never used smokeless tobacco.      Counseling Resources:  ATP IV Guidelines  Pooled Cohorts Equation Calculator  Breast Cancer Risk Calculator  BRCA-Related Cancer Risk Assessment: FHS-7 Tool  FRAX Risk Assessment  ICSI Preventive Guidelines  Dietary Guidelines for Americans, 2010  USDA's MyPlate  ASA Prophylaxis  Lung CA Screening    Anna Culp, ANJALI CNP  [facility]  "

## 2021-05-18 ENCOUNTER — OFFICE VISIT (OUTPATIENT)
Dept: FAMILY MEDICINE | Facility: OTHER | Age: 51
End: 2021-05-18
Payer: COMMERCIAL

## 2021-05-18 ENCOUNTER — ANCILLARY PROCEDURE (OUTPATIENT)
Dept: GENERAL RADIOLOGY | Facility: OTHER | Age: 51
End: 2021-05-18
Attending: STUDENT IN AN ORGANIZED HEALTH CARE EDUCATION/TRAINING PROGRAM
Payer: COMMERCIAL

## 2021-05-18 VITALS
BODY MASS INDEX: 47.05 KG/M2 | HEART RATE: 55 BPM | TEMPERATURE: 97.8 F | WEIGHT: 272.25 LBS | DIASTOLIC BLOOD PRESSURE: 68 MMHG | OXYGEN SATURATION: 100 % | SYSTOLIC BLOOD PRESSURE: 132 MMHG

## 2021-05-18 DIAGNOSIS — R06.02 SOB (SHORTNESS OF BREATH): ICD-10-CM

## 2021-05-18 DIAGNOSIS — Z13.1 SCREENING FOR DIABETES MELLITUS: ICD-10-CM

## 2021-05-18 DIAGNOSIS — Z00.00 ROUTINE GENERAL MEDICAL EXAMINATION AT A HEALTH CARE FACILITY: Primary | ICD-10-CM

## 2021-05-18 DIAGNOSIS — F33.1 MODERATE EPISODE OF RECURRENT MAJOR DEPRESSIVE DISORDER (H): ICD-10-CM

## 2021-05-18 DIAGNOSIS — D64.9 ANEMIA, UNSPECIFIED TYPE: ICD-10-CM

## 2021-05-18 DIAGNOSIS — J01.00 ACUTE NON-RECURRENT MAXILLARY SINUSITIS: ICD-10-CM

## 2021-05-18 DIAGNOSIS — Z12.11 SPECIAL SCREENING FOR MALIGNANT NEOPLASMS, COLON: ICD-10-CM

## 2021-05-18 DIAGNOSIS — E03.8 SUBCLINICAL HYPOTHYROIDISM: ICD-10-CM

## 2021-05-18 LAB
ALBUMIN SERPL-MCNC: 3.6 G/DL (ref 3.4–5)
ALP SERPL-CCNC: 71 U/L (ref 40–150)
ALT SERPL W P-5'-P-CCNC: 19 U/L (ref 0–50)
ANION GAP SERPL CALCULATED.3IONS-SCNC: 4 MMOL/L (ref 3–14)
AST SERPL W P-5'-P-CCNC: 16 U/L (ref 0–45)
BILIRUB SERPL-MCNC: 0.5 MG/DL (ref 0.2–1.3)
BUN SERPL-MCNC: 6 MG/DL (ref 7–30)
CALCIUM SERPL-MCNC: 8.9 MG/DL (ref 8.5–10.1)
CHLORIDE SERPL-SCNC: 108 MMOL/L (ref 94–109)
CO2 SERPL-SCNC: 29 MMOL/L (ref 20–32)
CREAT SERPL-MCNC: 0.8 MG/DL (ref 0.52–1.04)
D DIMER PPP FEU-MCNC: 0.3 UG/ML FEU (ref 0–0.5)
ERYTHROCYTE [DISTWIDTH] IN BLOOD BY AUTOMATED COUNT: 15.4 % (ref 10–15)
FERRITIN SERPL-MCNC: 20 NG/ML (ref 8–252)
GFR SERPL CREATININE-BSD FRML MDRD: 85 ML/MIN/{1.73_M2}
GLUCOSE SERPL-MCNC: 89 MG/DL (ref 70–99)
HCT VFR BLD AUTO: 43.1 % (ref 35–47)
HGB BLD-MCNC: 13.9 G/DL (ref 11.7–15.7)
INR PPP: 0.99 (ref 0.86–1.14)
IRON SATN MFR SERPL: 17 % (ref 15–46)
IRON SERPL-MCNC: 62 UG/DL (ref 35–180)
MCH RBC QN AUTO: 29.7 PG (ref 26.5–33)
MCHC RBC AUTO-ENTMCNC: 32.3 G/DL (ref 31.5–36.5)
MCV RBC AUTO: 92 FL (ref 78–100)
PLATELET # BLD AUTO: 232 10E9/L (ref 150–450)
POTASSIUM SERPL-SCNC: 3.6 MMOL/L (ref 3.4–5.3)
PROT SERPL-MCNC: 6.7 G/DL (ref 6.8–8.8)
RBC # BLD AUTO: 4.68 10E12/L (ref 3.8–5.2)
SODIUM SERPL-SCNC: 141 MMOL/L (ref 133–144)
TIBC SERPL-MCNC: 367 UG/DL (ref 240–430)
VIT B12 SERPL-MCNC: 609 PG/ML (ref 193–986)
WBC # BLD AUTO: 7.7 10E9/L (ref 4–11)

## 2021-05-18 PROCEDURE — 85610 PROTHROMBIN TIME: CPT | Performed by: STUDENT IN AN ORGANIZED HEALTH CARE EDUCATION/TRAINING PROGRAM

## 2021-05-18 PROCEDURE — 99396 PREV VISIT EST AGE 40-64: CPT | Performed by: STUDENT IN AN ORGANIZED HEALTH CARE EDUCATION/TRAINING PROGRAM

## 2021-05-18 PROCEDURE — 83550 IRON BINDING TEST: CPT | Performed by: STUDENT IN AN ORGANIZED HEALTH CARE EDUCATION/TRAINING PROGRAM

## 2021-05-18 PROCEDURE — 83540 ASSAY OF IRON: CPT | Performed by: STUDENT IN AN ORGANIZED HEALTH CARE EDUCATION/TRAINING PROGRAM

## 2021-05-18 PROCEDURE — 71046 X-RAY EXAM CHEST 2 VIEWS: CPT | Performed by: RADIOLOGY

## 2021-05-18 PROCEDURE — 36415 COLL VENOUS BLD VENIPUNCTURE: CPT | Performed by: STUDENT IN AN ORGANIZED HEALTH CARE EDUCATION/TRAINING PROGRAM

## 2021-05-18 PROCEDURE — 99214 OFFICE O/P EST MOD 30 MIN: CPT | Mod: 25 | Performed by: STUDENT IN AN ORGANIZED HEALTH CARE EDUCATION/TRAINING PROGRAM

## 2021-05-18 PROCEDURE — 82607 VITAMIN B-12: CPT | Performed by: STUDENT IN AN ORGANIZED HEALTH CARE EDUCATION/TRAINING PROGRAM

## 2021-05-18 PROCEDURE — 85379 FIBRIN DEGRADATION QUANT: CPT | Performed by: STUDENT IN AN ORGANIZED HEALTH CARE EDUCATION/TRAINING PROGRAM

## 2021-05-18 PROCEDURE — 85027 COMPLETE CBC AUTOMATED: CPT | Performed by: STUDENT IN AN ORGANIZED HEALTH CARE EDUCATION/TRAINING PROGRAM

## 2021-05-18 PROCEDURE — 82728 ASSAY OF FERRITIN: CPT | Performed by: STUDENT IN AN ORGANIZED HEALTH CARE EDUCATION/TRAINING PROGRAM

## 2021-05-18 PROCEDURE — 80053 COMPREHEN METABOLIC PANEL: CPT | Performed by: STUDENT IN AN ORGANIZED HEALTH CARE EDUCATION/TRAINING PROGRAM

## 2021-05-18 RX ORDER — VENLAFAXINE HYDROCHLORIDE 75 MG/1
75 CAPSULE, EXTENDED RELEASE ORAL DAILY
Qty: 90 CAPSULE | Refills: 3 | Status: CANCELLED | OUTPATIENT
Start: 2021-05-18

## 2021-05-18 RX ORDER — LEVOTHYROXINE SODIUM 75 UG/1
75 TABLET ORAL DAILY
Qty: 90 TABLET | Refills: 3 | Status: SHIPPED | OUTPATIENT
Start: 2021-05-18 | End: 2023-07-13

## 2021-05-18 ASSESSMENT — ENCOUNTER SYMPTOMS
SHORTNESS OF BREATH: 1
JOINT SWELLING: 0
EYE PAIN: 0
WEAKNESS: 0
DIARRHEA: 0
BREAST MASS: 0
FEVER: 0
CHILLS: 0
DYSURIA: 0
HEADACHES: 0
HEARTBURN: 0
ARTHRALGIAS: 0
FREQUENCY: 0
ABDOMINAL PAIN: 1
CONSTIPATION: 0
PALPITATIONS: 0
HEMATURIA: 0
MYALGIAS: 0
COUGH: 0
HEMATOCHEZIA: 0
DIZZINESS: 0
SORE THROAT: 0
NERVOUS/ANXIOUS: 0
PARESTHESIAS: 0
NAUSEA: 1

## 2021-05-19 ENCOUNTER — TELEPHONE (OUTPATIENT)
Dept: FAMILY MEDICINE | Facility: OTHER | Age: 51
End: 2021-05-19

## 2021-05-19 NOTE — TELEPHONE ENCOUNTER
Date of procedure: 6/9  Colonoscopy  Surgeon: Dr. Lewis  Prep:Miralax  Packet:Colonoscopy/EGD instructions were sent to the patient in [employer].   Date: 5/19/2021      Surgery Scheduler

## 2021-05-19 NOTE — TELEPHONE ENCOUNTER
Central Prior Authorization Team   Phone: [phone]    PA Initiation    Medication: levothyroxine (SYNTHROID/LEVOTHROID) 75 MCG tablet  Insurance Company:    Pharmacy Filling the Rx: [id number] - [place], [place] - [facility] [address] AT [facility] & [address]  Filling Pharmacy Phone: [phone]  Filling Pharmacy Fax: [phone]  Start Date: 5/19/2021            [phone]

## 2021-05-19 NOTE — LETTER

## 2021-05-19 NOTE — TELEPHONE ENCOUNTER
Spoke with pharmacist, she stated it is not a PA that is needed.  Patient's insurance will only allow 3 fills at a retail pharmacy.  Patient will need to contact her insurance [phone].  Pharmacist stated if patient notifies her insurance that she is refilling at a retail pharmacy her insurance will put in an override.  VM left for patient.

## 2021-05-24 DIAGNOSIS — Z11.59 ENCOUNTER FOR SCREENING FOR OTHER VIRAL DISEASES: ICD-10-CM

## 2021-06-05 DIAGNOSIS — Z11.59 ENCOUNTER FOR SCREENING FOR OTHER VIRAL DISEASES: ICD-10-CM

## 2021-06-05 LAB
SARS-COV-2 RNA RESP QL NAA+PROBE: NORMAL
SPECIMEN SOURCE: NORMAL

## 2021-06-05 PROCEDURE — U0005 INFEC AGEN DETEC AMPLI PROBE: HCPCS | Performed by: SURGERY

## 2021-06-05 PROCEDURE — U0003 INFECTIOUS AGENT DETECTION BY NUCLEIC ACID (DNA OR RNA); SEVERE ACUTE RESPIRATORY SYNDROME CORONAVIRUS 2 (SARS-COV-2) (CORONAVIRUS DISEASE [COVID-19]), AMPLIFIED PROBE TECHNIQUE, MAKING USE OF HIGH THROUGHPUT TECHNOLOGIES AS DESCRIBED BY CMS-2020-01-R: HCPCS | Performed by: SURGERY

## 2021-06-06 LAB
LABORATORY COMMENT REPORT: NORMAL
SARS-COV-2 RNA RESP QL NAA+PROBE: NEGATIVE
SPECIMEN SOURCE: NORMAL

## 2021-06-09 ENCOUNTER — ANESTHESIA EVENT (OUTPATIENT)
Dept: GASTROENTEROLOGY | Facility: CLINIC | Age: 51
End: 2021-06-09
Payer: COMMERCIAL

## 2021-06-09 ENCOUNTER — HOSPITAL ENCOUNTER (OUTPATIENT)
Facility: CLINIC | Age: 51
Discharge: HOME | End: 2021-06-09
Attending: SURGERY | Admitting: SURGERY
Payer: COMMERCIAL

## 2021-06-09 ENCOUNTER — ANESTHESIA (OUTPATIENT)
Dept: GASTROENTEROLOGY | Facility: CLINIC | Age: 51
End: 2021-06-09
Payer: COMMERCIAL

## 2021-06-09 VITALS
HEART RATE: 68 BPM | TEMPERATURE: 97.8 F | DIASTOLIC BLOOD PRESSURE: 70 MMHG | SYSTOLIC BLOOD PRESSURE: 142 MMHG | RESPIRATION RATE: 16 BRPM | WEIGHT: 272 LBS | HEIGHT: 64 IN | OXYGEN SATURATION: 100 % | BODY MASS INDEX: 46.44 KG/M2

## 2021-06-09 LAB — COLONOSCOPY: NORMAL

## 2021-06-09 PROCEDURE — G0121 COLON CA SCRN NOT HI RSK IND: HCPCS | Performed by: SURGERY

## 2021-06-09 PROCEDURE — 258N000003 HC RX IP 258 OP 636: Performed by: SURGERY

## 2021-06-09 PROCEDURE — 370N000017 HC ANESTHESIA TECHNICAL FEE, PER MIN: Performed by: SURGERY

## 2021-06-09 PROCEDURE — 250N000009 HC RX 250: Performed by: SURGERY

## 2021-06-09 PROCEDURE — 250N000009 HC RX 250: Performed by: NURSE ANESTHETIST, CERTIFIED REGISTERED

## 2021-06-09 PROCEDURE — 45378 DIAGNOSTIC COLONOSCOPY: CPT | Performed by: SURGERY

## 2021-06-09 PROCEDURE — 250N000011 HC RX IP 250 OP 636: Performed by: NURSE ANESTHETIST, CERTIFIED REGISTERED

## 2021-06-09 RX ORDER — NALOXONE HYDROCHLORIDE 0.4 MG/ML
0.2 INJECTION, SOLUTION INTRAMUSCULAR; INTRAVENOUS; SUBCUTANEOUS
Status: CANCELLED | OUTPATIENT
Start: 2021-06-09 | End: 2021-06-10

## 2021-06-09 RX ORDER — SODIUM CHLORIDE, SODIUM LACTATE, POTASSIUM CHLORIDE, CALCIUM CHLORIDE 600; 310; 30; 20 MG/100ML; MG/100ML; MG/100ML; MG/100ML
INJECTION, SOLUTION INTRAVENOUS CONTINUOUS
Status: DISCONTINUED | OUTPATIENT
Start: 2021-06-09 | End: 2021-06-09 | Stop reason: HOSPADM

## 2021-06-09 RX ORDER — NALOXONE HYDROCHLORIDE 0.4 MG/ML
0.4 INJECTION, SOLUTION INTRAMUSCULAR; INTRAVENOUS; SUBCUTANEOUS
Status: CANCELLED | OUTPATIENT
Start: 2021-06-09 | End: 2021-06-10

## 2021-06-09 RX ORDER — PROPOFOL 10 MG/ML
INJECTION, EMULSION INTRAVENOUS CONTINUOUS PRN
Status: DISCONTINUED | OUTPATIENT
Start: 2021-06-09 | End: 2021-06-09

## 2021-06-09 RX ORDER — LIDOCAINE 40 MG/G
CREAM TOPICAL
Status: DISCONTINUED | OUTPATIENT
Start: 2021-06-09 | End: 2021-06-09 | Stop reason: HOSPADM

## 2021-06-09 RX ORDER — MEPERIDINE HYDROCHLORIDE 25 MG/ML
12.5 INJECTION INTRAMUSCULAR; INTRAVENOUS; SUBCUTANEOUS
Status: CANCELLED | OUTPATIENT
Start: 2021-06-09

## 2021-06-09 RX ORDER — PROPOFOL 10 MG/ML
INJECTION, EMULSION INTRAVENOUS PRN
Status: DISCONTINUED | OUTPATIENT
Start: 2021-06-09 | End: 2021-06-09

## 2021-06-09 RX ORDER — ONDANSETRON 2 MG/ML
4 INJECTION INTRAMUSCULAR; INTRAVENOUS EVERY 30 MIN PRN
Status: CANCELLED | OUTPATIENT
Start: 2021-06-09

## 2021-06-09 RX ORDER — SODIUM CHLORIDE, SODIUM LACTATE, POTASSIUM CHLORIDE, CALCIUM CHLORIDE 600; 310; 30; 20 MG/100ML; MG/100ML; MG/100ML; MG/100ML
INJECTION, SOLUTION INTRAVENOUS CONTINUOUS
Status: CANCELLED | OUTPATIENT
Start: 2021-06-09

## 2021-06-09 RX ORDER — ONDANSETRON 4 MG/1
4 TABLET, ORALLY DISINTEGRATING ORAL EVERY 30 MIN PRN
Status: CANCELLED | OUTPATIENT
Start: 2021-06-09

## 2021-06-09 RX ORDER — LIDOCAINE HYDROCHLORIDE 20 MG/ML
INJECTION, SOLUTION INFILTRATION; PERINEURAL PRN
Status: DISCONTINUED | OUTPATIENT
Start: 2021-06-09 | End: 2021-06-09

## 2021-06-09 RX ADMIN — PROPOFOL 200 MCG/KG/MIN: 10 INJECTION, EMULSION INTRAVENOUS at 11:39

## 2021-06-09 RX ADMIN — LIDOCAINE HYDROCHLORIDE 60 MG: 20 INJECTION, SOLUTION INFILTRATION; PERINEURAL at 11:39

## 2021-06-09 RX ADMIN — SODIUM CHLORIDE, POTASSIUM CHLORIDE, SODIUM LACTATE AND CALCIUM CHLORIDE: 600; 310; 30; 20 INJECTION, SOLUTION INTRAVENOUS at 11:11

## 2021-06-09 RX ADMIN — LIDOCAINE HYDROCHLORIDE 0.1 ML: 10 INJECTION, SOLUTION EPIDURAL; INFILTRATION; INTRACAUDAL; PERINEURAL at 11:11

## 2021-06-09 RX ADMIN — PROPOFOL 70 MG: 10 INJECTION, EMULSION INTRAVENOUS at 11:39

## 2021-06-09 ASSESSMENT — MIFFLIN-ST. JEOR: SCORE: 1833.78

## 2021-06-09 ASSESSMENT — LIFESTYLE VARIABLES: TOBACCO_USE: 0

## 2021-06-09 NOTE — ANESTHESIA PREPROCEDURE EVALUATION
Anesthesia Pre-Procedure Evaluation    Patient: Fazal Gao   MRN: [id number] : 1970        Preoperative Diagnosis: Special screening for malignant neoplasms, colon [Z12.11]   Procedure : Procedure(s):  Colonoscopy with possible biopsy and/or polypectomy     Past Medical History:   Diagnosis Date     Dysthymic disorder      Hypothyroid       Past Surgical History:   Procedure Laterality Date     ABDOMEN SURGERY      Gastric bypass     CHOLECYSTECTOMY       GASTRIC BYPASS      w/byp; short james-en-y      HYSTERECTOMY VAGINAL, BILATERAL SALPINGO-OOPHERECTOMY, COMBINED       HYSTERECTOMY, PAP NO LONGER INDICATED        No Known Allergies   Social History     Tobacco Use     Smoking status: Never Smoker     Smokeless tobacco: Never Used   Substance Use Topics     Alcohol use: Yes     Comment: rarely      Wt Readings from Last 1 Encounters:   21 123.5 kg (272 lb 4 oz)        Anesthesia Evaluation   Pt has had prior anesthetic.     No history of anesthetic complications       ROS/MED HX  ENT/Pulmonary:    (-) tobacco use   Neurologic:  - neg neurologic ROS     Cardiovascular:     (+) -----Previous cardiac testing   Echo: Date: Results:    Stress Test: Date: 18 Results:  ОЛЬГА Stovall MD  [phone] 2018     Narrative & Impression    [id number]  [id number]  [id number]  [id number]^BERYL^FLAQUITA^[facility]  Echocardiography Laboratory  [address] [place] [id number]  Name: FAZAL GAO  MRN: [id number]  : 1970  Study Date: 2018 02:12 PM  Age: 48 yrs  Gender: Female  Patient Location: [facility]  Reason For Study: Chest Pain  History: Chest Pain  Ordering Physician: FLAQUITA CORDOBA  Performed By: Elsy Pollock RDCS     BSA: 2.3 m2  Height: 63 in  Weight: 304 lb  HR: 57  BP: 124/44 mmHg  _____________________________________________________________________________  __        Procedure  Contrast Definity. Stress  Echo Bike with two dimensional, color and spectral  Doppler performed.  _____________________________________________________________________________  __        Interpretation Summary  This was a normal stress echocardiogram with no evidence of stress-induced  ischemia. Normal resting LV function with EF of approximately 60-65%; normal  response to exercise with increase to approximately 70-75%. No stress induced  regional wall motion abnormalities. No ECG evidence of ischemia. No  significant valvular disease noted on routine screening color flow Doppler and  pulsed Doppler examination. Poor functional capacity.  No resting wallmotion abnormality.  The patient did not exhibit any symptoms during exercise.  Normal blood pressure response with stress.  _____________________________________________________________________________  __     Stress  Definity (NDC #08075-478-17) given intravenously.  There was a normal BP response to exercise.  Patient was given 5ml mixture of 1.5ml Definity and 8.5ml saline.  5 ml wasted.  Definity Expiration 7/1/19 .  Definity Lot # 6216 .  This was a normal stress EKG with no evidence of stress-induced ischemia.  Peak MVO2 10.9 ml/kg/min .  Percent predicted MVO2 37.5 %.  RPP 08278.  Maximum workload 100 barnett.  Target Heart Rate was achieved.  Exercise was stopped due to fatigue.  This study was stopped as the patient achieved an adequate exercise effort for  a diagnostic study.  The patient did not exhibit any symptoms during exercise.  Normal blood pressure response with stress.     Rest  Normal baseline electrocardiogram.  The patient did not exhibit any symptoms during exercise.  Limiting Symptom (s) : fatigue.  Angina present? No.     Stress Results      Protocol:  Bike Stress Echo        Maximum Predicted HR:   172 bpm        Target HR: 146 bpm                 % Maximum Predicted HR: 97 %                             Stage  DurationHeart Rate  BP                                  (mm:ss)   (bpm)                         Baseline  0:00      57    124/44                           Peak    4:00     166    175/78                            Stress Duration:   4:00 mm:ss *                      Maximum Stress HR: 166 bpm *     Vessels  Aortic root normal.     _____________________________________________________________________________  __               ECG Reviewed: Date: 9-12-18 Results:  SB  Cath:  Date: Results:      METS/Exercise Tolerance:     Hematologic:  - neg hematologic  ROS     Musculoskeletal:  - neg musculoskeletal ROS     GI/Hepatic: Comment: Gastric bypass   (-) GERD   Renal/Genitourinary:  - neg Renal ROS     Endo:     (+) thyroid problem, hypothyroidism, Obesity,     Psychiatric/Substance Use:     (+) psychiatric history depression     Infectious Disease:  - neg infectious disease ROS     Malignancy:  - neg malignancy ROS     Other:  - neg other ROS          Physical Exam    Airway        Mallampati: II   TM distance: > 3 FB   Neck ROM: full   Mouth opening: > 3 cm    Respiratory Devices and Support         Dental  no notable dental history         Cardiovascular   cardiovascular exam normal       Rhythm and rate: regular and normal     Pulmonary   pulmonary exam normal        breath sounds clear to auscultation           OUTSIDE LABS:  CBC:   Lab Results   Component Value Date    WBC 7.7 05/18/2021    WBC 5.3 09/12/2018    HGB 13.9 05/18/2021    HGB 14.1 09/12/2018    HCT 43.1 05/18/2021    HCT 43.0 09/12/2018     05/18/2021     09/12/2018     BMP:   Lab Results   Component Value Date     05/18/2021     09/12/2018    POTASSIUM 3.6 05/18/2021    POTASSIUM 3.4 09/12/2018    CHLORIDE 108 05/18/2021    CHLORIDE 108 09/12/2018    CO2 29 05/18/2021    CO2 28 09/12/2018    BUN 6 (L) 05/18/2021    BUN 8 09/12/2018    CR 0.80 05/18/2021    CR 0.82 09/12/2018    GLC 89 05/18/2021    GLC 86 09/12/2018     COAGS:   Lab Results   Component Value Date    PTT 33  09/12/2018    INR 0.99 05/18/2021     POC: No results found for: BGM, HCG, HCGS  HEPATIC:   Lab Results   Component Value Date    ALBUMIN 3.6 05/18/2021    PROTTOTAL 6.7 (L) 05/18/2021    ALT 19 05/18/2021    AST 16 05/18/2021    ALKPHOS 71 05/18/2021    BILITOTAL 0.5 05/18/2021     OTHER:   Lab Results   Component Value Date    MARTY 8.9 05/18/2021    MAG 1.9 09/12/2018    TSH 1.77 07/23/2019    T4 0.80 01/07/2019    T3 100 03/28/2007       Anesthesia Plan    ASA Status:  2   NPO Status:  NPO Appropriate    Anesthesia Type: MAC.     - Reason for MAC: straight local not clinically adequate   Induction: Intravenous, Propofol.   Maintenance: TIVA.        Consents    Anesthesia Plan(s) and associated risks, benefits, and realistic alternatives discussed. Questions answered and patient/representative(s) expressed understanding.     - Discussed with:  Patient      - Extended Intubation/Ventilatory Support Discussed: No.      - Patient is DNR/DNI Status: No    Use of blood products discussed: No .     Postoperative Care            Comments:    The risks and benefits of anesthesia, and the alternatives where applicable, have been discussed with the patient, and they wish to proceed.            ANJALI Chu CRNA

## 2021-06-09 NOTE — ANESTHESIA POSTPROCEDURE EVALUATION
Patient: Socorro Del Cid    Procedure(s):  Colonoscopy    Diagnosis:Special screening for malignant neoplasms, colon [Z12.11]  Diagnosis Additional Information: No value filed.    Anesthesia Type:  MAC    Note:  Disposition: Outpatient   Postop Pain Control: Uneventful            Sign Out: Well controlled pain   PONV: No   Neuro/Psych: Uneventful            Sign Out: Acceptable/Baseline neuro status   Airway/Respiratory: Uneventful            Sign Out: Acceptable/Baseline resp. status   CV/Hemodynamics: Uneventful            Sign Out: Acceptable CV status   Other NRE: NONE   DID A NON-ROUTINE EVENT OCCUR? No    Event details/Postop Comments:  Pt was happy with anesthesia care.  No complications.  I will follow up with the pt if needed.           Last vitals:  Vitals:    06/09/21 1057   BP: (!) 150/77   Resp: 20   Temp: 97.8  F (36.6  C)   SpO2: 99%       Last vitals prior to Anesthesia Care Transfer:  CRNA VITALS  6/9/2021 1129 - 6/9/2021 1205      6/9/2021             Resp Rate (observed):  (!) 4          Electronically Signed By: ANJALI Chu CRNA  June 9, 2021  12:05 PM

## 2021-06-09 NOTE — DISCHARGE INSTRUCTIONS
[facility]    Home Care Following Endoscopy          Activity:    You have just undergone an endoscopic procedure usually performed with conscious sedation.  Do not work or operate machinery (including a car) for at least 12 hours.      I encourage you to walk and attempt to pass this air as soon as possible.    Diet:    Return to the diet you were on before your procedure but eat lightly for the first 12-24 hours.    Drink plenty of water.    Resume any regular medications unless otherwise advised by your physician.  Please begin any new medication prescribed as a result of your procedure as directed by your physician.     If you had any biopsy or polyp removed please refrain from aspirin or aspirin products for 2 days.  If on Coumadin please restart as instructed by your physician.   Pain:    You may take Tylenol as needed for pain.  Expected Recovery:    You can expect some mild abdominal fullness and/or discomfort due to the air used to inflate your intestinal tract. It is also normal to have a mild sore throat after upper endoscopy.    Call Your Physician if You Have:      After Colonoscopy:  o Worsening persisting abdominal pain which is worse with activity.  o Fevers (>101 degrees F), chills or shakes.  o Passage of continued blood with bowel movements.   Any questions or concerns about your recovery, please call [phone] or after hours [phone] Nurse Advice Line.    Follow-up Care:    You should receive a call or letter with your results within 1 week. Please call if you have not received a notification of your results.  If asked to return to clinic please make an appointment 1 week after your procedure.  Call [phone].

## 2021-06-09 NOTE — ANESTHESIA CARE TRANSFER NOTE
Patient: Socorro Del Cid    Procedure(s):  Colonoscopy    Diagnosis: Special screening for malignant neoplasms, colon [Z12.11]  Diagnosis Additional Information: No value filed.    Anesthesia Type:   MAC     Note:    Oropharynx: oropharynx clear of all foreign objects and spontaneously breathing  Level of Consciousness: drowsy  Oxygen Supplementation: face mask    Independent Airway: airway patency satisfactory and stable  Dentition: dentition unchanged  Vital Signs Stable: post-procedure vital signs reviewed and stable  Report to RN Given: handoff report given  Patient transferred to: Phase II    Handoff Report: Identifed the Patient, Identified the Reponsible Provider, Reviewed the pertinent medical history, Discussed the surgical course, Reviewed Intra-OP anesthesia mangement and issues during anesthesia, Set expectations for post-procedure period and Allowed opportunity for questions and acknowledgement of understanding      Vitals: (Last set prior to Anesthesia Care Transfer)  CRNA VITALS  6/9/2021 1129 - 6/9/2021 1204      6/9/2021             Resp Rate (observed):  (!) 4        Electronically Signed By: ANJALI Chu CRNA  June 9, 2021  12:04 PM

## 2021-06-09 NOTE — INTERVAL H&P NOTE
The History and Physical has been reviewed, the patient has been examined and no changes have occurred in the patient's condition since the H & P was completed.     1st colonoscopy; no famhx of colon cancer; no anticoagulation    [facility], [place], DO

## 2021-06-15 ENCOUNTER — OFFICE VISIT (OUTPATIENT)
Dept: FAMILY MEDICINE | Facility: OTHER | Age: 51
End: 2021-06-15
Payer: COMMERCIAL

## 2021-06-15 ENCOUNTER — ANCILLARY PROCEDURE (OUTPATIENT)
Dept: GENERAL RADIOLOGY | Facility: OTHER | Age: 51
End: 2021-06-15
Attending: STUDENT IN AN ORGANIZED HEALTH CARE EDUCATION/TRAINING PROGRAM
Payer: COMMERCIAL

## 2021-06-15 VITALS
OXYGEN SATURATION: 100 % | BODY MASS INDEX: 46.95 KG/M2 | DIASTOLIC BLOOD PRESSURE: 72 MMHG | HEART RATE: 62 BPM | WEIGHT: 275 LBS | HEIGHT: 64 IN | RESPIRATION RATE: 16 BRPM | SYSTOLIC BLOOD PRESSURE: 126 MMHG | TEMPERATURE: 96.7 F

## 2021-06-15 DIAGNOSIS — M54.50 LEFT-SIDED LOW BACK PAIN WITHOUT SCIATICA, UNSPECIFIED CHRONICITY: ICD-10-CM

## 2021-06-15 DIAGNOSIS — M54.50 LEFT-SIDED LOW BACK PAIN WITHOUT SCIATICA, UNSPECIFIED CHRONICITY: Primary | ICD-10-CM

## 2021-06-15 DIAGNOSIS — R10.9 FLANK PAIN: ICD-10-CM

## 2021-06-15 LAB
ALBUMIN UR-MCNC: NEGATIVE MG/DL
APPEARANCE UR: CLEAR
BILIRUB UR QL STRIP: NEGATIVE
COLOR UR AUTO: YELLOW
GLUCOSE UR STRIP-MCNC: NEGATIVE MG/DL
HGB UR QL STRIP: NEGATIVE
KETONES UR STRIP-MCNC: NEGATIVE MG/DL
LEUKOCYTE ESTERASE UR QL STRIP: NEGATIVE
NITRATE UR QL: NEGATIVE
PH UR STRIP: 5.5 PH (ref 5–7)
SOURCE: NORMAL
SP GR UR STRIP: <=1.005 (ref 1–1.03)
UROBILINOGEN UR STRIP-ACNC: 0.2 EU/DL (ref 0.2–1)

## 2021-06-15 PROCEDURE — 81003 URINALYSIS AUTO W/O SCOPE: CPT | Performed by: STUDENT IN AN ORGANIZED HEALTH CARE EDUCATION/TRAINING PROGRAM

## 2021-06-15 PROCEDURE — 72100 X-RAY EXAM L-S SPINE 2/3 VWS: CPT | Performed by: RADIOLOGY

## 2021-06-15 PROCEDURE — 99213 OFFICE O/P EST LOW 20 MIN: CPT | Performed by: STUDENT IN AN ORGANIZED HEALTH CARE EDUCATION/TRAINING PROGRAM

## 2021-06-15 RX ORDER — CYCLOBENZAPRINE HCL 10 MG
10 TABLET ORAL 3 TIMES DAILY PRN
Qty: 30 TABLET | Refills: 0 | Status: SHIPPED | OUTPATIENT
Start: 2021-06-15 | End: 2023-07-13

## 2021-06-15 ASSESSMENT — PATIENT HEALTH QUESTIONNAIRE - PHQ9
SUM OF ALL RESPONSES TO PHQ QUESTIONS 1-9: 1
SUM OF ALL RESPONSES TO PHQ QUESTIONS 1-9: 1
10. IF YOU CHECKED OFF ANY PROBLEMS, HOW DIFFICULT HAVE THESE PROBLEMS MADE IT FOR YOU TO DO YOUR WORK, TAKE CARE OF THINGS AT HOME, OR GET ALONG WITH OTHER PEOPLE: NOT DIFFICULT AT ALL

## 2021-06-15 ASSESSMENT — MIFFLIN-ST. JEOR: SCORE: 1842.01

## 2021-06-15 ASSESSMENT — PAIN SCALES - GENERAL: PAINLEVEL: MODERATE PAIN (4)

## 2021-06-15 NOTE — PROGRESS NOTES
Assessment & Plan     Left-sided low back pain without sciatica, unspecified chronicity  I suspect the pain may be a radiculopathy given UA is normal. There is some curvature of the lumbar spine on exam and x-ray shows mild to moderate degenerative disc disease. We discussed that weight causes stress on the spine and that working on weight loss will help. I gave her a muscle relaxer to see if this helps with the pain. Discussed doing a steroid burst but she declines this for now given side effect of increased appetite and not wanting to increase her weight.   - XR Lumbar Spine 2/3 Views; Future  - Orthopedic & Spine  Referral; Future  - cyclobenzaprine (FLEXERIL) 10 MG tablet; Take 1 tablet (10 mg) by mouth 3 times daily as needed for muscle spasms    Flank pain  UA negative.   - *UA reflex to Microscopic and Culture ([place] [facility] (except [place] and [place])       No follow-ups on file.    Anna Culp, APRN CNP  [facility] ROMAN Reyez is a 51 year old who presents for the following health issues     History of Present Illness       Back Pain:  She presents for follow up of back pain. Patient's back pain is a new problem.    Original cause of back pain: not sure  First noticed back pain: 1-4 weeks ago  Patient feels back pain: comes and goesLocation of back pain:  Left lower back and left side of waist  Description of back pain: burning, dull ache and shooting  Back pain spreads: nowhere    Since patient first noticed back pain, pain is: always present, but gets better and worse  Does back pain interfere with her job:  Not applicable  On a scale of 1-10 (10 being the worst), patient describes pain as:  4  What makes back pain worse: nothing  Acupuncture: not tried  Acetaminophen: helpful  Activity or exercise: not tried  Chiropractor:  Helpful  Cold: not tried  Heat: not tried  Massage: not tried  Muscle relaxants: not tried  NSAIDS:  "helpful  Opioids: not tried  Physical Therapy: not tried  Rest: helpful  Steroid Injection: not tried  Stretching: helpful  Surgery: not tried  TENS unit: not tried  Topical pain relievers: not tried  Other healthcare providers patient is seeing for back pain: Chiropractor    She eats 2-3 servings of fruits and vegetables daily.She consumes 1 sweetened beverage(s) daily.She exercises with enough effort to increase her heart rate 9 or less minutes per day.  She exercises with enough effort to increase her heart rate 3 or less days per week.   She is taking medications regularly.       Concern - Left Side Flank pain  Onset: Started about 2 weeks ago  Description: Comes and goes and starts on Left side will radiate to from but not all the way to stomach area.  Intensity: moderate  Progression of Symptoms:  intermittent and waxing and waning  Accompanying Signs & Symptoms: no  Previous history of similar problem: no  Precipitating factors:        Worsened by: no  Alleviating factors:        Improved by: Ibuprofen will help for a little while  Therapies tried and outcome: Ibuprofen        Review of Systems   Constitutional, HEENT, cardiovascular, pulmonary, gi and gu systems are negative, except as otherwise noted.      Objective    /72   Pulse 62   Temp 96.7  F (35.9  C) (Temporal)   Resp 16   Ht 1.617 m (5' 3.66\")   Wt 124.7 kg (275 lb)   SpO2 100%   BMI 47.71 kg/m    Body mass index is 47.71 kg/m .  Physical Exam   GENERAL: healthy, alert and no distress  NECK: no adenopathy, no asymmetry, masses, or scars and thyroid normal to palpation  RESP: lungs clear to auscultation - no rales, rhonchi or wheezes  CV: regular rate and rhythm, normal S1 S2, no S3 or S4, no murmur, click or rub, no peripheral edema and peripheral pulses strong  MS: no gross musculoskeletal defects noted, no edema  SKIN: no suspicious lesions or rashes  NEURO: Normal strength and tone, mentation intact and speech normal  BACK: no CVA " tenderness, no paralumbar tenderness    Results for orders placed or performed in visit on 06/15/21   XR Lumbar Spine 2/3 Views     Status: None    Narrative    LUMBAR SPINE TWO TO THREE VIEWS  Yuki 15, 2021 4:45 PM     HISTORY: Left-sided low back pain for the past three weeks, midline  tenderness over lumbar spine. Left-sided low back pain without  sciatica, unspecified chronicity.    COMPARISON: None.      Impression    IMPRESSION: There are five nonrib-bearing lumbar vertebral bodies. No  gross vertebral body height loss. Mild dextroconvex curvature centered  at L3. Minimal retrolisthesis of L2 on L3 and L3 on L4. Alignment  otherwise is within normal limits. Mild-to-moderate multilevel  degenerative disc space narrowing. Multilevel degenerative facet  arthropathy.    YURIY CABRAL MD   Results for orders placed or performed in visit on 06/15/21   *UA reflex to Microscopic and Culture ([place] [facility] (except [place] and [place])     Status: None    Specimen: Midstream Urine   Result Value Ref Range    Color Urine Yellow     Appearance Urine Clear     Glucose Urine Negative NEG^Negative mg/dL    Bilirubin Urine Negative NEG^Negative    Ketones Urine Negative NEG^Negative mg/dL    Specific Gravity Urine <=1.005 1.003 - 1.035    Blood Urine Negative NEG^Negative    pH Urine 5.5 5.0 - 7.0 pH    Protein Albumin Urine Negative NEG^Negative mg/dL    Urobilinogen Urine 0.2 0.2 - 1.0 EU/dL    Nitrite Urine Negative NEG^Negative    Leukocyte Esterase Urine Negative NEG^Negative    Source Midstream Urine                Answers for HPI/ROS submitted by the patient on 6/15/2021   Chronic problems general questions HPI Form  If you checked off any problems, how difficult have these problems made it for you to do your work, take care of things at home, or get along with other people?: Not difficult at all  PHQ9 TOTAL SCORE: 1

## 2021-06-16 ASSESSMENT — PATIENT HEALTH QUESTIONNAIRE - PHQ9: SUM OF ALL RESPONSES TO PHQ QUESTIONS 1-9: 1

## 2021-09-25 ENCOUNTER — HEALTH MAINTENANCE LETTER (OUTPATIENT)
Age: 51
End: 2021-09-25

## 2021-11-12 NOTE — TELEPHONE ENCOUNTER
PATIENT LEFT MESSAGE RETURNING CALL. RETURNED CALL TO PATIENT. REACHED VOICEMAIL. LEFT DETAILED MESSAGE FOR RETURN CALL.   Pt states she has 2 days left of antibiotics states it is somewhat getting better but is still having pain when she bends over and is still having the the muffled sound.      Dennise Kamara CMA

## 2022-01-15 ENCOUNTER — HEALTH MAINTENANCE LETTER (OUTPATIENT)
Age: 52
End: 2022-01-15

## 2022-07-02 ENCOUNTER — HEALTH MAINTENANCE LETTER (OUTPATIENT)
Age: 52
End: 2022-07-02

## 2023-01-07 ENCOUNTER — HEALTH MAINTENANCE LETTER (OUTPATIENT)
Age: 53
End: 2023-01-07

## 2023-04-22 ENCOUNTER — HEALTH MAINTENANCE LETTER (OUTPATIENT)
Age: 53
End: 2023-04-22

## 2023-07-13 ENCOUNTER — OFFICE VISIT (OUTPATIENT)
Dept: FAMILY MEDICINE | Facility: OTHER | Age: 53
End: 2023-07-13
Payer: COMMERCIAL

## 2023-07-13 VITALS
HEART RATE: 51 BPM | HEIGHT: 64 IN | BODY MASS INDEX: 44.39 KG/M2 | TEMPERATURE: 97.4 F | DIASTOLIC BLOOD PRESSURE: 80 MMHG | RESPIRATION RATE: 16 BRPM | OXYGEN SATURATION: 99 % | WEIGHT: 260 LBS | SYSTOLIC BLOOD PRESSURE: 116 MMHG

## 2023-07-13 DIAGNOSIS — E03.9 ACQUIRED HYPOTHYROIDISM: ICD-10-CM

## 2023-07-13 DIAGNOSIS — Z12.31 VISIT FOR SCREENING MAMMOGRAM: ICD-10-CM

## 2023-07-13 DIAGNOSIS — Z11.59 NEED FOR HEPATITIS C SCREENING TEST: ICD-10-CM

## 2023-07-13 DIAGNOSIS — H69.93 DYSFUNCTION OF BOTH EUSTACHIAN TUBES: ICD-10-CM

## 2023-07-13 DIAGNOSIS — H93.8X9 PLUGGED FEELING IN EAR, UNSPECIFIED LATERALITY: Primary | ICD-10-CM

## 2023-07-13 DIAGNOSIS — Z11.4 SCREENING FOR HIV (HUMAN IMMUNODEFICIENCY VIRUS): ICD-10-CM

## 2023-07-13 LAB
ALBUMIN SERPL BCG-MCNC: 3.6 G/DL (ref 3.5–5.2)
ALP SERPL-CCNC: 88 U/L (ref 35–104)
ALT SERPL W P-5'-P-CCNC: 23 U/L (ref 0–50)
ANION GAP SERPL CALCULATED.3IONS-SCNC: 9 MMOL/L (ref 7–15)
AST SERPL W P-5'-P-CCNC: 26 U/L (ref 0–45)
BILIRUB SERPL-MCNC: 0.2 MG/DL
BUN SERPL-MCNC: 8 MG/DL (ref 6–20)
CALCIUM SERPL-MCNC: 9 MG/DL (ref 8.6–10)
CHLORIDE SERPL-SCNC: 107 MMOL/L (ref 98–107)
CREAT SERPL-MCNC: 0.8 MG/DL (ref 0.51–0.95)
DEPRECATED HCO3 PLAS-SCNC: 26 MMOL/L (ref 22–29)
GFR SERPL CREATININE-BSD FRML MDRD: 88 ML/MIN/1.73M2
GLUCOSE SERPL-MCNC: 98 MG/DL (ref 70–99)
POTASSIUM SERPL-SCNC: 4 MMOL/L (ref 3.4–5.3)
PROT SERPL-MCNC: 6.6 G/DL (ref 6.4–8.3)
SODIUM SERPL-SCNC: 142 MMOL/L (ref 136–145)
T4 FREE SERPL-MCNC: 0.88 NG/DL (ref 0.9–1.7)
TSH SERPL DL<=0.005 MIU/L-ACNC: 6.97 UIU/ML (ref 0.3–4.2)

## 2023-07-13 PROCEDURE — 90715 TDAP VACCINE 7 YRS/> IM: CPT | Performed by: PHYSICIAN ASSISTANT

## 2023-07-13 PROCEDURE — 80053 COMPREHEN METABOLIC PANEL: CPT | Performed by: PHYSICIAN ASSISTANT

## 2023-07-13 PROCEDURE — 83721 ASSAY OF BLOOD LIPOPROTEIN: CPT | Performed by: PHYSICIAN ASSISTANT

## 2023-07-13 PROCEDURE — 84439 ASSAY OF FREE THYROXINE: CPT | Performed by: PHYSICIAN ASSISTANT

## 2023-07-13 PROCEDURE — 84443 ASSAY THYROID STIM HORMONE: CPT | Performed by: PHYSICIAN ASSISTANT

## 2023-07-13 PROCEDURE — 36415 COLL VENOUS BLD VENIPUNCTURE: CPT | Performed by: PHYSICIAN ASSISTANT

## 2023-07-13 PROCEDURE — 99214 OFFICE O/P EST MOD 30 MIN: CPT | Mod: 25 | Performed by: PHYSICIAN ASSISTANT

## 2023-07-13 PROCEDURE — 90471 IMMUNIZATION ADMIN: CPT | Performed by: PHYSICIAN ASSISTANT

## 2023-07-13 RX ORDER — MELOXICAM 15 MG/1
15 TABLET ORAL DAILY
Qty: 30 TABLET | Refills: 0 | Status: SHIPPED | OUTPATIENT
Start: 2023-07-13 | End: 2024-08-09

## 2023-07-13 ASSESSMENT — PATIENT HEALTH QUESTIONNAIRE - PHQ9
10. IF YOU CHECKED OFF ANY PROBLEMS, HOW DIFFICULT HAVE THESE PROBLEMS MADE IT FOR YOU TO DO YOUR WORK, TAKE CARE OF THINGS AT HOME, OR GET ALONG WITH OTHER PEOPLE: SOMEWHAT DIFFICULT
SUM OF ALL RESPONSES TO PHQ QUESTIONS 1-9: 2
SUM OF ALL RESPONSES TO PHQ QUESTIONS 1-9: 2

## 2023-07-13 NOTE — PROGRESS NOTES
"  Assessment & Plan     Plugged feeling in ear, unspecified laterality  Dysfunction of both eustachian tubes  Recommended that she use meloxicam to see if we can do for anti-inflammatory to help with eustachian tube dysfunction.  Recommended Allegra or Zyrtec and Flonase on a daily basis for the next 2 weeks or so and following up from there.  - meloxicam (MOBIC) 15 MG tablet; Take 1 tablet (15 mg) by mouth daily    Acquired hypothyroidism  Labs pending.  I suspect that she should have some medication to help with this.  Follow-up based on results.  - TSH WITH FREE T4 REFLEX; Future  - LDL cholesterol direct; Future  - Comprehensive metabolic panel (BMP + Alb, Alk Phos, ALT, AST, Total. Bili, TP); Future  - Comprehensive metabolic panel (BMP + Alb, Alk Phos, ALT, AST, Total. Bili, TP)  - LDL cholesterol direct  - TSH WITH FREE T4 REFLEX    Screening for HIV (human immunodeficiency virus)  Need for hepatitis C screening test  Declined screening considering her self low risk.    Visit for screening mammogram  Has had this done earlier this year.      BMI:   Estimated body mass index is 44.12 kg/m  as calculated from the following:    Height as of this encounter: 1.635 m (5' 4.37\").    Weight as of this encounter: 117.9 kg (260 lb).   Weight management plan: Discussed healthy diet and exercise guidelines    Work on weight loss  Regular exercise  MARIE Thompson [facility]    Shivam Reyez is a 53 year old, presenting for the following health issues:  Ear Problem        7/13/2023     3:27 PM   Additional Questions   Roomed by Cassandra REID   Accompanied by self         7/13/2023     3:27 PM   Patient Reported Additional Medications   Patient reports taking the following new medications Neomycin-Polymyxin-HC ear drops     History of Present Illness       Reason for visit:  Plugged ear. Can't hear very well.  Symptom onset:  More than a month  Symptoms include:  Plugged ear. Can't hear.  Symptom " intensity:  Moderate  Symptom progression:  Worsening  Had these symptoms before:  No    She eats 2-3 servings of fruits and vegetables daily.She consumes 0 sweetened beverage(s) daily.She exercises with enough effort to increase her heart rate 9 or less minutes per day.  She exercises with enough effort to increase her heart rate 3 or less days per week.   She is taking medications regularly.    Today's PHQ-9         PHQ-9 Total Score: 2    PHQ-9 Q9 Thoughts of better off dead/self-harm past 2 weeks :   Not at all    How difficult have these problems made it for you to do your work, take care of things at home, or get along with other people: Somewhat difficult    Left ear, end of April/beginning of May. Crackling in ear and would feel plugged off and on 4th of July it felt plugged but was having pain so [facility] with swimmer's ear and on neomycin-polymyxin-HC ear, pain went away but plugged still and hearing has diminished     Concern - Ear plugged, can't hear well  Onset: originally started end of April/beginning of May as crackling in ear and plugged feeling, then went away and came back around 4th of July  Description: when came back 4th of July went to [facility] and Dx with Sw[facility]'s [facility] and was put on ear drops (Neomycin-Polymyxin-HC solution)  Intensity: severe  Progression of Symptoms:  worsening and constant  Accompanying Signs & Symptoms: plugged feeling in left ear and hearing diminished  Previous history of similar problem: none  Precipitating factors:        Worsened by: none  Alleviating factors:        Improved by: none, the drops that were prescribed not helping  Therapies tried and outcome: drops that were prescribed by [facility] but the plugged feeling feels worse now. She did see a doctor in [place] about it when it originally began and was put on Allergy medicine which did nothing for it.    Review of Systems   Constitutional, HEENT, cardiovascular, pulmonary, GI, ,  "musculoskeletal, neuro, skin, endocrine and psych systems are negative, except as otherwise noted.      Objective    /80   Pulse 51   Temp 97.4  F (36.3  C) (Temporal)   Resp 16   Ht 1.635 m (5' 4.37\")   Wt 117.9 kg (260 lb)   SpO2 99%   BMI 44.12 kg/m    Body mass index is 44.12 kg/m .  Physical Exam   GENERAL: healthy, alert and no distress  NECK: no adenopathy, no asymmetry, masses, or scars and thyroid normal to palpation  HEENT:    Head normocephalic, hair normal, skin normal.    Eyes: RAPHAEL/EOM  Ears:  Bilateral EAC's clear, TM's gray with abnormal bony landmarks and abnormal light reflex, negative effusion.  Consistent with retraction and eustachian tube dysfunction more on the left than on the right  Nose: Bilateral nares patent, tissue pink no discharge noted.  Throat:  oral pharynx with good hydration, negative for tonsillar hypertrophy, negative pitting, ulceration or exudates.  RESP: lungs clear to auscultation - no rales, rhonchi or wheezes  CV: regular rate and rhythm, normal S1 S2, no S3 or S4, no murmur, click or rub, no peripheral edema and peripheral pulses strong  ABDOMEN: soft, nontender, no hepatosplenomegaly, no masses and bowel sounds normal  MS: no gross musculoskeletal defects noted, no edema    No results found for this or any previous visit (from the past 24 hour(s)).                "

## 2023-07-13 NOTE — PROGRESS NOTES
Prior to immunization administration, verified patients identity using patient s name and date of birth. Please see Immunization Activity for additional information.     Screening Questionnaire for Adult Immunization    Are you sick today?   No   Do you have allergies to medications, food, a vaccine component or latex?   No   Have you ever had a serious reaction after receiving a vaccination?   No   Do you have a long-term health problem with heart, lung, kidney, or metabolic disease (e.g., diabetes), asthma, a blood disorder, no spleen, complement component deficiency, a cochlear implant, or a spinal fluid leak?  Are you on long-term aspirin therapy?   No   Do you have cancer, leukemia, HIV/AIDS, or any other immune system problem?   No   Do you have a parent, brother, or sister with an immune system problem?   No   In the past 3 months, have you taken medications that affect  your immune system, such as prednisone, other steroids, or anticancer drugs; drugs for the treatment of rheumatoid arthritis, Crohn s disease, or psoriasis; or have you had radiation treatments?   No   Have you had a seizure, or a brain or other nervous system problem?   No   During the past year, have you received a transfusion of blood or blood    products, or been given immune (gamma) globulin or antiviral drug?   No   For women: Are you pregnant or is there a chance you could become       pregnant during the next month?   No   Have you received any vaccinations in the past 4 weeks?   No     Immunization questionnaire answers were all negative.      Patient instructed to remain in clinic for 15 minutes afterwards, and to report any adverse reactions.     Screening performed by Cassandra Saxena CMA on 7/13/2023 at 4:07 PM.

## 2023-07-14 DIAGNOSIS — E03.4 HYPOTHYROIDISM DUE TO ACQUIRED ATROPHY OF THYROID: Primary | ICD-10-CM

## 2023-07-14 LAB — LDLC SERPL DIRECT ASSAY-MCNC: 98 MG/DL

## 2023-07-14 RX ORDER — LEVOTHYROXINE SODIUM 75 UG/1
75 TABLET ORAL DAILY
Qty: 90 TABLET | Refills: 3 | Status: SHIPPED | OUTPATIENT
Start: 2023-07-14 | End: 2024-07-12

## 2023-07-14 RX ORDER — ESCITALOPRAM OXALATE 10 MG/1
TABLET ORAL
COMMUNITY
Start: 2023-05-05 | End: 2024-08-09

## 2023-07-14 RX ORDER — FERROUS SULFATE 325(65) MG
TABLET ORAL
COMMUNITY
Start: 2023-04-25 | End: 2024-08-09

## 2023-07-14 RX ORDER — HYDROXYZINE HYDROCHLORIDE 25 MG/1
TABLET, FILM COATED ORAL
COMMUNITY
Start: 2023-04-25 | End: 2024-08-09

## 2023-07-14 RX ORDER — LORATADINE 10 MG/1
TABLET ORAL
COMMUNITY
Start: 2023-04-25 | End: 2024-08-09

## 2024-06-09 ENCOUNTER — OFFICE VISIT (OUTPATIENT)
Dept: URGENT CARE | Facility: URGENT CARE | Age: 54
End: 2024-06-09
Payer: COMMERCIAL

## 2024-06-09 VITALS
TEMPERATURE: 98.3 F | BODY MASS INDEX: 44.46 KG/M2 | RESPIRATION RATE: 17 BRPM | SYSTOLIC BLOOD PRESSURE: 115 MMHG | WEIGHT: 262 LBS | OXYGEN SATURATION: 98 % | HEART RATE: 56 BPM | DIASTOLIC BLOOD PRESSURE: 75 MMHG

## 2024-06-09 DIAGNOSIS — B02.9 HERPES ZOSTER WITHOUT COMPLICATION: Primary | ICD-10-CM

## 2024-06-09 PROCEDURE — 99213 OFFICE O/P EST LOW 20 MIN: CPT

## 2024-06-09 RX ORDER — VALACYCLOVIR HYDROCHLORIDE 1 G/1
1000 TABLET, FILM COATED ORAL 3 TIMES DAILY
Qty: 21 TABLET | Refills: 0 | Status: SHIPPED | OUTPATIENT
Start: 2024-06-09 | End: 2024-08-09

## 2024-06-09 NOTE — PATIENT INSTRUCTIONS
Take the valacyclovir as prescribed and finish the full course even if symptoms improve.  You can use cool compress or calamine lotion to the areas for your symptoms.

## 2024-06-09 NOTE — PROGRESS NOTES
ASSESSMENT:  (B02.9) Herpes zoster without complication  (primary encounter diagnosis)  Plan: valACYclovir (VALTREX) 1000 mg tablet    PLAN:  Shingles patient instructions discussed and provided.  Informed the patient to take valacyclovir as prescribed and finish the full course even if symptoms improve.  We discussed using cool compresses or calamine lotion to the area for the symptoms as well.  We also discussed the need to return to clinic with any new or worsening symptoms.  Patient acknowledged her understanding of the above plan.    The use of Dragon/Vinfolioation services may have been used to construct the content in this note; any grammatical or spelling errors are non-intentional. Please contact the author of this note directly if you are in need of any clarification.      ANJALI Delgado CNP      SUBJECTIVE:  Socorro Del Cid is a 54 year old female who presents to the clinic today for a rash.  Onset of rash was 4 days ago.  Rash is gradual onset.  Location of the rash: right side of abdomen.  Symptoms are mild and rash seems to be worsening.  Associated symptoms include: itching and pain.  Therapies tried to improve the rash: none.  Recent new medication? No  Previous history of a similar rash? No  Recent exposure history: none known      ROS:  Negative except noted above.    OBJECTIVE:  EXAM:  VITALS: /75   Pulse 56   Temp 98.3  F (36.8  C) (Tympanic)   Resp 17   Wt 118.8 kg (262 lb)   SpO2 98%   BMI 44.46 kg/m    GENERAL APPEARANCE: healthy, alert and no distress  SKIN: Rash description:    Location: right side of abdomen over the T11 dermatome    Distribution: localized over the above identified area  Lesion grouping: clustered  Lesion type: vesicular  Color: red  Nail exam: normal- no clubbing or nail changes  Hair exam: normal

## 2024-06-29 ENCOUNTER — HEALTH MAINTENANCE LETTER (OUTPATIENT)
Age: 54
End: 2024-06-29

## 2024-07-09 DIAGNOSIS — E03.4 HYPOTHYROIDISM DUE TO ACQUIRED ATROPHY OF THYROID: ICD-10-CM

## 2024-07-09 RX ORDER — LEVOTHYROXINE SODIUM 75 UG/1
75 TABLET ORAL DAILY
Qty: 90 TABLET | Refills: 1 | OUTPATIENT
Start: 2024-07-09

## 2024-07-09 NOTE — TELEPHONE ENCOUNTER
Attempted to call patient with the following message below.  Unable a voicemail for the patient to call the clinic back.    Blanca Braswell MA

## 2024-07-10 NOTE — TELEPHONE ENCOUNTER
Patient sent mychart message to schedule, postponing message to see if patient reads mychart.   Celeste Dunaway MA

## 2024-07-12 RX ORDER — LEVOTHYROXINE SODIUM 75 UG/1
75 TABLET ORAL DAILY
Qty: 30 TABLET | Refills: 0 | Status: SHIPPED | OUTPATIENT
Start: 2024-07-12 | End: 2024-08-09

## 2024-08-08 ASSESSMENT — PATIENT HEALTH QUESTIONNAIRE - PHQ9
SUM OF ALL RESPONSES TO PHQ QUESTIONS 1-9: 21
SUM OF ALL RESPONSES TO PHQ QUESTIONS 1-9: 21

## 2024-08-09 ENCOUNTER — OFFICE VISIT (OUTPATIENT)
Dept: FAMILY MEDICINE | Facility: OTHER | Age: 54
End: 2024-08-09
Payer: COMMERCIAL

## 2024-08-09 VITALS
HEART RATE: 56 BPM | HEIGHT: 63 IN | DIASTOLIC BLOOD PRESSURE: 84 MMHG | RESPIRATION RATE: 19 BRPM | TEMPERATURE: 98.5 F | WEIGHT: 262 LBS | BODY MASS INDEX: 46.42 KG/M2 | SYSTOLIC BLOOD PRESSURE: 130 MMHG | OXYGEN SATURATION: 98 %

## 2024-08-09 DIAGNOSIS — Z12.31 VISIT FOR SCREENING MAMMOGRAM: Primary | ICD-10-CM

## 2024-08-09 DIAGNOSIS — E66.01 MORBID OBESITY (H): ICD-10-CM

## 2024-08-09 DIAGNOSIS — F34.1 DYSTHYMIC DISORDER: ICD-10-CM

## 2024-08-09 DIAGNOSIS — E03.4 HYPOTHYROIDISM DUE TO ACQUIRED ATROPHY OF THYROID: ICD-10-CM

## 2024-08-09 DIAGNOSIS — Z00.00 ROUTINE HISTORY AND PHYSICAL EXAMINATION OF ADULT: ICD-10-CM

## 2024-08-09 DIAGNOSIS — E03.9 ACQUIRED HYPOTHYROIDISM: ICD-10-CM

## 2024-08-09 DIAGNOSIS — F33.1 MODERATE EPISODE OF RECURRENT MAJOR DEPRESSIVE DISORDER (H): ICD-10-CM

## 2024-08-09 LAB
ALBUMIN SERPL BCG-MCNC: 4 G/DL (ref 3.5–5.2)
ALP SERPL-CCNC: 90 U/L (ref 40–150)
ALT SERPL W P-5'-P-CCNC: 18 U/L (ref 0–50)
ANION GAP SERPL CALCULATED.3IONS-SCNC: 12 MMOL/L (ref 7–15)
AST SERPL W P-5'-P-CCNC: 18 U/L (ref 0–45)
BILIRUB SERPL-MCNC: 0.3 MG/DL
BUN SERPL-MCNC: 9.5 MG/DL (ref 6–20)
CALCIUM SERPL-MCNC: 9.2 MG/DL (ref 8.8–10.4)
CHLORIDE SERPL-SCNC: 106 MMOL/L (ref 98–107)
CHOLEST SERPL-MCNC: 201 MG/DL
CREAT SERPL-MCNC: 0.84 MG/DL (ref 0.51–0.95)
EGFRCR SERPLBLD CKD-EPI 2021: 82 ML/MIN/1.73M2
ERYTHROCYTE [DISTWIDTH] IN BLOOD BY AUTOMATED COUNT: 12.7 % (ref 10–15)
FASTING STATUS PATIENT QL REPORTED: YES
FASTING STATUS PATIENT QL REPORTED: YES
GLUCOSE SERPL-MCNC: 94 MG/DL (ref 70–99)
HCO3 SERPL-SCNC: 25 MMOL/L (ref 22–29)
HCT VFR BLD AUTO: 41.3 % (ref 35–47)
HDLC SERPL-MCNC: 81 MG/DL
HGB BLD-MCNC: 13.5 G/DL (ref 11.7–15.7)
LDLC SERPL CALC-MCNC: 108 MG/DL
MCH RBC QN AUTO: 30.4 PG (ref 26.5–33)
MCHC RBC AUTO-ENTMCNC: 32.7 G/DL (ref 31.5–36.5)
MCV RBC AUTO: 93 FL (ref 78–100)
NONHDLC SERPL-MCNC: 120 MG/DL
PLATELET # BLD AUTO: 218 10E3/UL (ref 150–450)
POTASSIUM SERPL-SCNC: 4.6 MMOL/L (ref 3.4–5.3)
PROT SERPL-MCNC: 7 G/DL (ref 6.4–8.3)
RBC # BLD AUTO: 4.44 10E6/UL (ref 3.8–5.2)
SODIUM SERPL-SCNC: 143 MMOL/L (ref 135–145)
TRIGL SERPL-MCNC: 61 MG/DL
TSH SERPL DL<=0.005 MIU/L-ACNC: 2.07 UIU/ML (ref 0.3–4.2)
WBC # BLD AUTO: 5.7 10E3/UL (ref 4–11)

## 2024-08-09 PROCEDURE — 36415 COLL VENOUS BLD VENIPUNCTURE: CPT | Performed by: PHYSICIAN ASSISTANT

## 2024-08-09 PROCEDURE — 82306 VITAMIN D 25 HYDROXY: CPT | Performed by: PHYSICIAN ASSISTANT

## 2024-08-09 PROCEDURE — 99396 PREV VISIT EST AGE 40-64: CPT | Performed by: PHYSICIAN ASSISTANT

## 2024-08-09 PROCEDURE — 84443 ASSAY THYROID STIM HORMONE: CPT | Performed by: PHYSICIAN ASSISTANT

## 2024-08-09 PROCEDURE — 99214 OFFICE O/P EST MOD 30 MIN: CPT | Mod: 25 | Performed by: PHYSICIAN ASSISTANT

## 2024-08-09 PROCEDURE — 80061 LIPID PANEL: CPT | Performed by: PHYSICIAN ASSISTANT

## 2024-08-09 PROCEDURE — 85027 COMPLETE CBC AUTOMATED: CPT | Performed by: PHYSICIAN ASSISTANT

## 2024-08-09 PROCEDURE — 80053 COMPREHEN METABOLIC PANEL: CPT | Performed by: PHYSICIAN ASSISTANT

## 2024-08-09 RX ORDER — LEVOTHYROXINE SODIUM 75 UG/1
75 TABLET ORAL DAILY
Qty: 90 TABLET | Refills: 3 | Status: SHIPPED | OUTPATIENT
Start: 2024-08-09

## 2024-08-09 NOTE — PROGRESS NOTES
Assessment & Plan     Routine history and physical examination of adult  54-year-old female here for routine physical.  Labs pending.  Follow-up based on results.  Due.  - CBC with platelets; Future  - Comprehensive metabolic panel (BMP + Alb, Alk Phos, ALT, AST, Total. Bili, TP); Future  - Lipid panel reflex to direct LDL Fasting; Future  - CBC with platelets  - Comprehensive metabolic panel (BMP + Alb, Alk Phos, ALT, AST, Total. Bili, TP)  - Lipid panel reflex to direct LDL Fasting    Visit for screening mammogram  - MA Screening Bilateral w/ Chris; Future    Acquired hypothyroidism  Due for related labs.  Follow-up based on results.  - TSH with free T4 reflex; Future  - Vitamin D Deficiency; Future  - Adult Mental Health  Referral; Future  - TSH with free T4 reflex  - Vitamin D Deficiency    Morbid obesity (H)  Still problematic with a body mass index of 46.31.  Related labs pending.  Follow-up based on results.  - TSH with free T4 reflex; Future  - Vitamin D Deficiency; Future  - Adult Mental Health  Referral; Future  - TSH with free T4 reflex  - Vitamin D Deficiency    Moderate episode of recurrent major depressive disorder (H)  Has not been taking her medications as prescribed.  Does not want to take them right now.  Strongly advised counseling since she has a strained family relationships going on right now.  - TSH with free T4 reflex; Future  - Vitamin D Deficiency; Future  - Adult Mental Health  Referral; Future  - TSH with free T4 reflex  - Vitamin D Deficiency    Dysthymic disorder  - TSH with free T4 reflex; Future  - Vitamin D Deficiency; Future  - Adult Mental Health  Referral; Future  - TSH with free T4 reflex  - Vitamin D Deficiency    Hypothyroidism due to acquired atrophy of thyroid  - levothyroxine (SYNTHROID/LEVOTHROID) 75 MCG tablet; Take 1 tablet (75 mcg) by mouth daily          BMI  Estimated body mass index is 46.31 kg/m  as calculated from the  "following:    Height as of this encounter: 1.602 m (5' 3.07\").    Weight as of this encounter: 118.8 kg (262 lb).   Weight management plan: Discussed healthy diet and exercise guidelines      Work on weight loss  Regular exercise    Subjective   Aissatou is a 54 year old, presenting for the following health issues:  Thyroid Problem      8/9/2024     2:05 PM   Additional Questions   Roomed by edin   Accompanied by self     History of Present Illness       Hypothyroidism:     Since last visit, patient describes the following symptoms::  Depression and Dry skin    She eats 2-3 servings of fruits and vegetables daily.She consumes 0 sweetened beverage(s) daily.She exercises with enough effort to increase her heart rate 9 or less minutes per day.  She exercises with enough effort to increase her heart rate 3 or less days per week.   She is taking medications regularly.         Review of Systems  Constitutional, HEENT, cardiovascular, pulmonary, GI, , musculoskeletal, neuro, skin, endocrine and psych systems are negative, except as otherwise noted.      Objective    /84   Pulse 56   Temp 98.5  F (36.9  C) (Temporal)   Resp 19   Ht 1.602 m (5' 3.07\")   Wt 118.8 kg (262 lb)   SpO2 98%   BMI 46.31 kg/m    Body mass index is 46.31 kg/m .  Physical Exam   GENERAL: alert and no distress  EYES: Eyes grossly normal to inspection, PERRL and conjunctivae and sclerae normal  HENT: ear canals and TM's normal, nose and mouth without ulcers or lesions  NECK: no adenopathy, no asymmetry, masses, or scars  RESP: lungs clear to auscultation - no rales, rhonchi or wheezes  CV: regular rate and rhythm, normal S1 S2, no S3 or S4, no murmur, click or rub, no peripheral edema  ABDOMEN: soft, nontender, no hepatosplenomegaly, no masses and bowel sounds normal  MS: no gross musculoskeletal defects noted, no edema  SKIN: no suspicious lesions or rashes  NEURO: Normal strength and tone, mentation intact and speech normal  PSYCH: " mentation appears normal, affect normal/bright    No results found for this or any previous visit (from the past 24 hour(s)).        Signed Electronically by: Aron Lindsey PA-C

## 2024-08-10 LAB — VIT D+METAB SERPL-MCNC: 24 NG/ML (ref 20–50)

## 2024-09-10 ENCOUNTER — HOSPITAL ENCOUNTER (OUTPATIENT)
Dept: MAMMOGRAPHY | Facility: CLINIC | Age: 54
Discharge: HOME | End: 2024-09-10
Attending: PHYSICIAN ASSISTANT | Admitting: PHYSICIAN ASSISTANT
Payer: COMMERCIAL

## 2024-09-10 DIAGNOSIS — Z12.31 VISIT FOR SCREENING MAMMOGRAM: ICD-10-CM

## 2024-09-10 PROCEDURE — 77063 BREAST TOMOSYNTHESIS BI: CPT

## 2024-10-26 ASSESSMENT — PATIENT HEALTH QUESTIONNAIRE - PHQ9: SUM OF ALL RESPONSES TO PHQ QUESTIONS 1-9: 7

## 2024-10-28 ASSESSMENT — PATIENT HEALTH QUESTIONNAIRE - PHQ9: SUM OF ALL RESPONSES TO PHQ QUESTIONS 1-9: 7

## 2025-07-14 ENCOUNTER — PATIENT OUTREACH (OUTPATIENT)
Dept: CARE COORDINATION | Facility: CLINIC | Age: 55
End: 2025-07-14
Payer: COMMERCIAL

## 2025-08-07 DIAGNOSIS — E03.4 HYPOTHYROIDISM DUE TO ACQUIRED ATROPHY OF THYROID: ICD-10-CM

## 2025-08-07 RX ORDER — LEVOTHYROXINE SODIUM 75 UG/1
75 TABLET ORAL DAILY
Qty: 90 TABLET | Refills: 0 | Status: SHIPPED | OUTPATIENT
Start: 2025-08-07